# Patient Record
Sex: MALE | Race: WHITE | Employment: OTHER | URBAN - METROPOLITAN AREA
[De-identification: names, ages, dates, MRNs, and addresses within clinical notes are randomized per-mention and may not be internally consistent; named-entity substitution may affect disease eponyms.]

---

## 2018-03-12 ENCOUNTER — HOSPITAL ENCOUNTER (OUTPATIENT)
Age: 63
Discharge: HOME | End: 2018-03-12
Payer: COMMERCIAL

## 2018-03-12 LAB
ANION GAP SERPL CALCULATED.3IONS-SCNC: 12 MMOL/L (ref 7–16)
BUN BLDV-MCNC: 66 MG/DL (ref 8–23)
CALCIUM SERPL-MCNC: 9.3 MG/DL (ref 8.6–10.2)
CHLORIDE BLD-SCNC: 102 MMOL/L (ref 98–107)
CO2: 24 MMOL/L (ref 22–29)
CREAT SERPL-MCNC: 2.6 MG/DL (ref 0.7–1.2)
GFR AFRICAN AMERICAN: 31
GFR NON-AFRICAN AMERICAN: 25 ML/MIN/1.73
GLUCOSE BLD-MCNC: 98 MG/DL (ref 74–109)
POTASSIUM SERPL-SCNC: 4.9 MMOL/L (ref 3.5–5)
SODIUM BLD-SCNC: 138 MMOL/L (ref 132–146)

## 2018-03-12 PROCEDURE — 36415 COLL VENOUS BLD VENIPUNCTURE: CPT

## 2018-03-12 PROCEDURE — 80048 BASIC METABOLIC PNL TOTAL CA: CPT

## 2018-06-18 ENCOUNTER — EMPLOYEE WELLNESS (OUTPATIENT)
Dept: OTHER | Age: 63
End: 2018-06-18

## 2018-06-18 LAB
CHOLESTEROL, TOTAL: 144 MG/DL (ref 0–199)
GLUCOSE BLD-MCNC: 88 MG/DL (ref 74–107)
HDLC SERPL-MCNC: 49 MG/DL
LDL CHOLESTEROL CALCULATED: 84 MG/DL (ref 0–99)
TRIGL SERPL-MCNC: 57 MG/DL (ref 0–149)

## 2018-06-25 VITALS — WEIGHT: 174 LBS

## 2018-11-01 ENCOUNTER — HOSPITAL ENCOUNTER (OUTPATIENT)
Age: 63
Discharge: HOME | End: 2018-11-01
Payer: COMMERCIAL

## 2018-11-01 LAB
ALT SERPL-CCNC: 22 U/L (ref 0–40)
ANION GAP SERPL CALCULATED.3IONS-SCNC: 15 MMOL/L (ref 7–16)
AST SERPL-CCNC: 16 U/L (ref 0–39)
BACTERIA: NORMAL /HPF
BASOPHILS ABSOLUTE: 0.09 E9/L (ref 0–0.2)
BASOPHILS RELATIVE PERCENT: 1.1 % (ref 0–2)
BILIRUBIN URINE: NEGATIVE
BLOOD, URINE: ABNORMAL
BUN BLDV-MCNC: 47 MG/DL (ref 8–23)
CALCIUM SERPL-MCNC: 8.6 MG/DL (ref 8.6–10.2)
CHLORIDE BLD-SCNC: 105 MMOL/L (ref 98–107)
CHOLESTEROL, TOTAL: 156 MG/DL (ref 0–199)
CLARITY: CLEAR
CO2: 25 MMOL/L (ref 22–29)
COLOR: YELLOW
CREAT SERPL-MCNC: 2.1 MG/DL (ref 0.7–1.2)
EOSINOPHILS ABSOLUTE: 0.24 E9/L (ref 0.05–0.5)
EOSINOPHILS RELATIVE PERCENT: 3 % (ref 0–6)
GFR AFRICAN AMERICAN: 39
GFR NON-AFRICAN AMERICAN: 32 ML/MIN/1.73
GLUCOSE BLD-MCNC: 91 MG/DL (ref 74–109)
GLUCOSE URINE: NEGATIVE MG/DL
HCT VFR BLD CALC: 40.7 % (ref 37–54)
HDLC SERPL-MCNC: 39 MG/DL
HEMOGLOBIN: 13.3 G/DL (ref 12.5–16.5)
IMMATURE GRANULOCYTES #: 0.03 E9/L
IMMATURE GRANULOCYTES %: 0.4 % (ref 0–5)
KETONES, URINE: NEGATIVE MG/DL
LDL CHOLESTEROL CALCULATED: 97 MG/DL (ref 0–99)
LEUKOCYTE ESTERASE, URINE: NEGATIVE
LYMPHOCYTES ABSOLUTE: 1.19 E9/L (ref 1.5–4)
LYMPHOCYTES RELATIVE PERCENT: 14.9 % (ref 20–42)
MAGNESIUM: 1.6 MG/DL (ref 1.6–2.6)
MCH RBC QN AUTO: 29.3 PG (ref 26–35)
MCHC RBC AUTO-ENTMCNC: 32.7 % (ref 32–34.5)
MCV RBC AUTO: 89.6 FL (ref 80–99.9)
MONOCYTES ABSOLUTE: 0.77 E9/L (ref 0.1–0.95)
MONOCYTES RELATIVE PERCENT: 9.6 % (ref 2–12)
NEUTROPHILS ABSOLUTE: 5.68 E9/L (ref 1.8–7.3)
NEUTROPHILS RELATIVE PERCENT: 71 % (ref 43–80)
NITRITE, URINE: NEGATIVE
PDW BLD-RTO: 13.3 FL (ref 11.5–15)
PH UA: 6 (ref 5–9)
PLATELET # BLD: 290 E9/L (ref 130–450)
PMV BLD AUTO: 9.4 FL (ref 7–12)
POTASSIUM SERPL-SCNC: 4.4 MMOL/L (ref 3.5–5)
PROSTATE SPECIFIC ANTIGEN: 0.43 NG/ML (ref 0–4)
PROTEIN UA: 100 MG/DL
RBC # BLD: 4.54 E12/L (ref 3.8–5.8)
RBC UA: NORMAL /HPF (ref 0–2)
SODIUM BLD-SCNC: 145 MMOL/L (ref 132–146)
SPECIFIC GRAVITY UA: 1.02 (ref 1–1.03)
TRIGL SERPL-MCNC: 98 MG/DL (ref 0–149)
TSH SERPL DL<=0.05 MIU/L-ACNC: 0.79 UIU/ML (ref 0.27–4.2)
UROBILINOGEN, URINE: 0.2 E.U./DL
VITAMIN D 25-HYDROXY: 27 NG/ML (ref 30–100)
VLDLC SERPL CALC-MCNC: 20 MG/DL
WBC # BLD: 8 E9/L (ref 4.5–11.5)
WBC UA: NORMAL /HPF (ref 0–5)

## 2018-11-01 PROCEDURE — 83735 ASSAY OF MAGNESIUM: CPT

## 2018-11-01 PROCEDURE — 84460 ALANINE AMINO (ALT) (SGPT): CPT

## 2018-11-01 PROCEDURE — 85025 COMPLETE CBC W/AUTO DIFF WBC: CPT

## 2018-11-01 PROCEDURE — G0103 PSA SCREENING: HCPCS

## 2018-11-01 PROCEDURE — 80061 LIPID PANEL: CPT

## 2018-11-01 PROCEDURE — 80048 BASIC METABOLIC PNL TOTAL CA: CPT

## 2018-11-01 PROCEDURE — 81001 URINALYSIS AUTO W/SCOPE: CPT

## 2018-11-01 PROCEDURE — 84450 TRANSFERASE (AST) (SGOT): CPT

## 2018-11-01 PROCEDURE — 82306 VITAMIN D 25 HYDROXY: CPT

## 2018-11-01 PROCEDURE — 36415 COLL VENOUS BLD VENIPUNCTURE: CPT

## 2018-11-01 PROCEDURE — 84443 ASSAY THYROID STIM HORMONE: CPT

## 2018-11-29 ENCOUNTER — HOSPITAL ENCOUNTER (OUTPATIENT)
Dept: NON INVASIVE DIAGNOSTICS | Age: 63
Discharge: HOME | End: 2018-11-29
Payer: COMMERCIAL

## 2018-11-29 VITALS — HEIGHT: 70 IN | BODY MASS INDEX: 25.05 KG/M2 | WEIGHT: 175 LBS

## 2018-11-29 LAB
LV EF: 60 %
LVEF MODALITY: NORMAL

## 2018-11-29 PROCEDURE — 93308 TTE F-UP OR LMTD: CPT

## 2018-11-29 PROCEDURE — 93350 STRESS TTE ONLY: CPT

## 2018-11-29 PROCEDURE — 93017 CV STRESS TEST TRACING ONLY: CPT

## 2018-11-29 PROCEDURE — 6360000004 HC RX CONTRAST MEDICATION: Performed by: FAMILY MEDICINE

## 2018-11-29 RX ADMIN — PERFLUTREN 1.65 MG: 6.52 INJECTION, SUSPENSION INTRAVENOUS at 10:20

## 2019-05-06 ENCOUNTER — HOSPITAL ENCOUNTER (OUTPATIENT)
Age: 64
Discharge: HOME | End: 2019-05-06
Payer: COMMERCIAL

## 2019-05-06 LAB
ALT SERPL-CCNC: 30 U/L (ref 0–40)
ANION GAP SERPL CALCULATED.3IONS-SCNC: 12 MMOL/L (ref 7–16)
AST SERPL-CCNC: 24 U/L (ref 0–39)
BACTERIA: ABNORMAL /HPF
BASOPHILS ABSOLUTE: 0.1 E9/L (ref 0–0.2)
BASOPHILS RELATIVE PERCENT: 1.2 % (ref 0–2)
BILIRUBIN URINE: NEGATIVE
BLOOD, URINE: ABNORMAL
BUN BLDV-MCNC: 63 MG/DL (ref 8–23)
CALCIUM SERPL-MCNC: 9.6 MG/DL (ref 8.6–10.2)
CHLORIDE BLD-SCNC: 102 MMOL/L (ref 98–107)
CHOLESTEROL, TOTAL: 177 MG/DL (ref 0–199)
CLARITY: CLEAR
CO2: 28 MMOL/L (ref 22–29)
COLOR: YELLOW
CREAT SERPL-MCNC: 2.9 MG/DL (ref 0.7–1.2)
EOSINOPHILS ABSOLUTE: 0.36 E9/L (ref 0.05–0.5)
EOSINOPHILS RELATIVE PERCENT: 4.1 % (ref 0–6)
GFR AFRICAN AMERICAN: 27
GFR NON-AFRICAN AMERICAN: 22 ML/MIN/1.73
GLUCOSE BLD-MCNC: 91 MG/DL (ref 74–99)
GLUCOSE URINE: NEGATIVE MG/DL
HCT VFR BLD CALC: 44 % (ref 37–54)
HDLC SERPL-MCNC: 45 MG/DL
HEMOGLOBIN: 14.4 G/DL (ref 12.5–16.5)
IMMATURE GRANULOCYTES #: 0.02 E9/L
IMMATURE GRANULOCYTES %: 0.2 % (ref 0–5)
KETONES, URINE: NEGATIVE MG/DL
LDL CHOLESTEROL CALCULATED: 114 MG/DL (ref 0–99)
LEUKOCYTE ESTERASE, URINE: NEGATIVE
LYMPHOCYTES ABSOLUTE: 1.16 E9/L (ref 1.5–4)
LYMPHOCYTES RELATIVE PERCENT: 13.4 % (ref 20–42)
MAGNESIUM: 2 MG/DL (ref 1.6–2.6)
MCH RBC QN AUTO: 30.3 PG (ref 26–35)
MCHC RBC AUTO-ENTMCNC: 32.7 % (ref 32–34.5)
MCV RBC AUTO: 92.6 FL (ref 80–99.9)
MONOCYTES ABSOLUTE: 0.87 E9/L (ref 0.1–0.95)
MONOCYTES RELATIVE PERCENT: 10 % (ref 2–12)
NEUTROPHILS ABSOLUTE: 6.17 E9/L (ref 1.8–7.3)
NEUTROPHILS RELATIVE PERCENT: 71.1 % (ref 43–80)
NITRITE, URINE: NEGATIVE
PDW BLD-RTO: 12.8 FL (ref 11.5–15)
PH UA: 5.5 (ref 5–9)
PLATELET # BLD: 267 E9/L (ref 130–450)
PMV BLD AUTO: 10.8 FL (ref 7–12)
POTASSIUM SERPL-SCNC: 4.9 MMOL/L (ref 3.5–5)
PROSTATE SPECIFIC ANTIGEN: 0.46 NG/ML (ref 0–4)
PROTEIN UA: 100 MG/DL
RBC # BLD: 4.75 E12/L (ref 3.8–5.8)
RBC UA: ABNORMAL /HPF (ref 0–2)
SODIUM BLD-SCNC: 142 MMOL/L (ref 132–146)
SPECIFIC GRAVITY UA: 1.01 (ref 1–1.03)
TRIGL SERPL-MCNC: 88 MG/DL (ref 0–149)
TSH SERPL DL<=0.05 MIU/L-ACNC: 1.27 UIU/ML (ref 0.27–4.2)
UROBILINOGEN, URINE: 0.2 E.U./DL
VITAMIN D 25-HYDROXY: 23 NG/ML (ref 30–100)
VLDLC SERPL CALC-MCNC: 18 MG/DL
WBC # BLD: 8.7 E9/L (ref 4.5–11.5)
WBC UA: ABNORMAL /HPF (ref 0–5)

## 2019-05-06 PROCEDURE — 81001 URINALYSIS AUTO W/SCOPE: CPT

## 2019-05-06 PROCEDURE — 80061 LIPID PANEL: CPT

## 2019-05-06 PROCEDURE — 84450 TRANSFERASE (AST) (SGOT): CPT

## 2019-05-06 PROCEDURE — 83735 ASSAY OF MAGNESIUM: CPT

## 2019-05-06 PROCEDURE — 36415 COLL VENOUS BLD VENIPUNCTURE: CPT

## 2019-05-06 PROCEDURE — 85025 COMPLETE CBC W/AUTO DIFF WBC: CPT

## 2019-05-06 PROCEDURE — G0103 PSA SCREENING: HCPCS

## 2019-05-06 PROCEDURE — 84443 ASSAY THYROID STIM HORMONE: CPT

## 2019-05-06 PROCEDURE — 82306 VITAMIN D 25 HYDROXY: CPT

## 2019-05-06 PROCEDURE — 80048 BASIC METABOLIC PNL TOTAL CA: CPT

## 2019-05-06 PROCEDURE — 84460 ALANINE AMINO (ALT) (SGPT): CPT

## 2021-03-21 ENCOUNTER — IMMUNIZATION (OUTPATIENT)
Dept: PRIMARY CARE CLINIC | Age: 66
End: 2021-03-21
Payer: COMMERCIAL

## 2021-03-21 PROCEDURE — 0001A COVID-19, PFIZER VACCINE 30MCG/0.3ML DOSE: CPT | Performed by: INTERNAL MEDICINE

## 2021-03-21 PROCEDURE — 91300 COVID-19, PFIZER VACCINE 30MCG/0.3ML DOSE: CPT | Performed by: INTERNAL MEDICINE

## 2021-04-19 ENCOUNTER — IMMUNIZATION (OUTPATIENT)
Dept: PRIMARY CARE CLINIC | Age: 66
End: 2021-04-19
Payer: COMMERCIAL

## 2021-04-19 PROCEDURE — 91300 COVID-19, PFIZER VACCINE 30MCG/0.3ML DOSE: CPT | Performed by: NURSE PRACTITIONER

## 2021-04-19 PROCEDURE — 0002A COVID-19, PFIZER VACCINE 30MCG/0.3ML DOSE: CPT | Performed by: NURSE PRACTITIONER

## 2021-12-07 ENCOUNTER — HOSPITAL ENCOUNTER (OUTPATIENT)
Age: 66
Discharge: HOME | End: 2021-12-07
Payer: COMMERCIAL

## 2021-12-07 LAB
ALT SERPL-CCNC: 35 U/L (ref 0–40)
ANION GAP SERPL CALCULATED.3IONS-SCNC: 14 MMOL/L (ref 7–16)
AST SERPL-CCNC: 23 U/L (ref 0–39)
BACTERIA: ABNORMAL /HPF
BASOPHILS ABSOLUTE: 0.1 E9/L (ref 0–0.2)
BASOPHILS RELATIVE PERCENT: 0.9 % (ref 0–2)
BILIRUBIN URINE: NEGATIVE
BLOOD, URINE: ABNORMAL
BUN BLDV-MCNC: 91 MG/DL (ref 6–23)
CALCIUM SERPL-MCNC: 9.2 MG/DL (ref 8.6–10.2)
CHLORIDE BLD-SCNC: 107 MMOL/L (ref 98–107)
CHOLESTEROL, TOTAL: 163 MG/DL (ref 0–199)
CLARITY: CLEAR
CO2: 18 MMOL/L (ref 22–29)
COLOR: YELLOW
CREAT SERPL-MCNC: 4.8 MG/DL (ref 0.7–1.2)
EOSINOPHILS ABSOLUTE: 0.34 E9/L (ref 0.05–0.5)
EOSINOPHILS RELATIVE PERCENT: 3 % (ref 0–6)
EPITHELIAL CELLS, UA: ABNORMAL /HPF
GFR AFRICAN AMERICAN: 15
GFR NON-AFRICAN AMERICAN: 12 ML/MIN/1.73
GLUCOSE BLD-MCNC: 108 MG/DL (ref 74–99)
GLUCOSE URINE: NEGATIVE MG/DL
HCT VFR BLD CALC: 35.8 % (ref 37–54)
HDLC SERPL-MCNC: 36 MG/DL
HEMOGLOBIN: 11.8 G/DL (ref 12.5–16.5)
IMMATURE GRANULOCYTES #: 0.06 E9/L
IMMATURE GRANULOCYTES %: 0.5 % (ref 0–5)
KETONES, URINE: NEGATIVE MG/DL
LDL CHOLESTEROL CALCULATED: 106 MG/DL (ref 0–99)
LEUKOCYTE ESTERASE, URINE: NEGATIVE
LYMPHOCYTES ABSOLUTE: 0.9 E9/L (ref 1.5–4)
LYMPHOCYTES RELATIVE PERCENT: 7.8 % (ref 20–42)
MAGNESIUM: 1.9 MG/DL (ref 1.6–2.6)
MCH RBC QN AUTO: 30 PG (ref 26–35)
MCHC RBC AUTO-ENTMCNC: 33 % (ref 32–34.5)
MCV RBC AUTO: 91.1 FL (ref 80–99.9)
MONOCYTES ABSOLUTE: 0.78 E9/L (ref 0.1–0.95)
MONOCYTES RELATIVE PERCENT: 6.8 % (ref 2–12)
NEUTROPHILS ABSOLUTE: 9.33 E9/L (ref 1.8–7.3)
NEUTROPHILS RELATIVE PERCENT: 81 % (ref 43–80)
NITRITE, URINE: NEGATIVE
PDW BLD-RTO: 13.9 FL (ref 11.5–15)
PH UA: 6 (ref 5–9)
PLATELET # BLD: 304 E9/L (ref 130–450)
PMV BLD AUTO: 10.3 FL (ref 7–12)
POTASSIUM SERPL-SCNC: 5.1 MMOL/L (ref 3.5–5)
PROSTATE SPECIFIC ANTIGEN: 0.43 NG/ML (ref 0–4)
PROTEIN UA: >=300 MG/DL
RBC # BLD: 3.93 E12/L (ref 3.8–5.8)
RBC UA: ABNORMAL /HPF (ref 0–2)
SODIUM BLD-SCNC: 139 MMOL/L (ref 132–146)
SPECIFIC GRAVITY UA: 1.02 (ref 1–1.03)
TRIGL SERPL-MCNC: 104 MG/DL (ref 0–149)
TSH SERPL DL<=0.05 MIU/L-ACNC: 1.41 UIU/ML (ref 0.27–4.2)
UROBILINOGEN, URINE: 0.2 E.U./DL
VITAMIN D 25-HYDROXY: 24 NG/ML (ref 30–100)
VLDLC SERPL CALC-MCNC: 21 MG/DL
WBC # BLD: 11.5 E9/L (ref 4.5–11.5)
WBC UA: ABNORMAL /HPF (ref 0–5)

## 2021-12-07 PROCEDURE — 36415 COLL VENOUS BLD VENIPUNCTURE: CPT

## 2021-12-07 PROCEDURE — G0103 PSA SCREENING: HCPCS

## 2021-12-07 PROCEDURE — 83735 ASSAY OF MAGNESIUM: CPT

## 2021-12-07 PROCEDURE — 84460 ALANINE AMINO (ALT) (SGPT): CPT

## 2021-12-07 PROCEDURE — 85025 COMPLETE CBC W/AUTO DIFF WBC: CPT

## 2021-12-07 PROCEDURE — 84450 TRANSFERASE (AST) (SGOT): CPT

## 2021-12-07 PROCEDURE — 80061 LIPID PANEL: CPT

## 2021-12-07 PROCEDURE — 80048 BASIC METABOLIC PNL TOTAL CA: CPT

## 2021-12-07 PROCEDURE — 82306 VITAMIN D 25 HYDROXY: CPT

## 2021-12-07 PROCEDURE — 81001 URINALYSIS AUTO W/SCOPE: CPT

## 2021-12-07 PROCEDURE — 84439 ASSAY OF FREE THYROXINE: CPT

## 2021-12-07 PROCEDURE — 84443 ASSAY THYROID STIM HORMONE: CPT

## 2021-12-09 LAB — T4 FREE: 1.03 NG/DL (ref 0.93–1.7)

## 2021-12-16 ENCOUNTER — HOSPITAL ENCOUNTER (OUTPATIENT)
Dept: ULTRASOUND IMAGING | Age: 66
Discharge: HOME | End: 2021-12-18
Payer: COMMERCIAL

## 2021-12-16 DIAGNOSIS — N17.9 ACUTE RENAL FAILURE, UNSPECIFIED ACUTE RENAL FAILURE TYPE (HCC): ICD-10-CM

## 2021-12-16 PROCEDURE — 76770 US EXAM ABDO BACK WALL COMP: CPT

## 2021-12-16 NOTE — PROGRESS NOTES
Patient came today for a STAT kidney ultrasound. Told patient that he would have to wait for results from the doctor. Patient decided not to wait and refused to sign AMA form.

## 2021-12-20 ENCOUNTER — HOSPITAL ENCOUNTER (OUTPATIENT)
Age: 66
Discharge: HOME | End: 2021-12-20
Payer: COMMERCIAL

## 2021-12-20 LAB
ANION GAP SERPL CALCULATED.3IONS-SCNC: 16 MMOL/L (ref 7–16)
BUN BLDV-MCNC: 81 MG/DL (ref 6–23)
CALCIUM SERPL-MCNC: 9.7 MG/DL (ref 8.6–10.2)
CHLORIDE BLD-SCNC: 102 MMOL/L (ref 98–107)
CO2: 20 MMOL/L (ref 22–29)
CREAT SERPL-MCNC: 5.4 MG/DL (ref 0.7–1.2)
GFR AFRICAN AMERICAN: 13
GFR NON-AFRICAN AMERICAN: 11 ML/MIN/1.73
GLUCOSE BLD-MCNC: 109 MG/DL (ref 74–99)
POTASSIUM SERPL-SCNC: 5.5 MMOL/L (ref 3.5–5)
SODIUM BLD-SCNC: 138 MMOL/L (ref 132–146)

## 2021-12-20 PROCEDURE — 36415 COLL VENOUS BLD VENIPUNCTURE: CPT

## 2021-12-20 PROCEDURE — 80048 BASIC METABOLIC PNL TOTAL CA: CPT

## 2022-01-06 ENCOUNTER — HOSPITAL ENCOUNTER (OUTPATIENT)
Age: 67
Discharge: HOME | End: 2022-01-06
Payer: COMMERCIAL

## 2022-01-06 LAB
ALBUMIN SERPL-MCNC: 3.8 G/DL (ref 3.5–5.2)
ALP BLD-CCNC: 77 U/L (ref 40–129)
ALT SERPL-CCNC: 36 U/L (ref 0–40)
ANION GAP SERPL CALCULATED.3IONS-SCNC: 16 MMOL/L (ref 7–16)
ANTI-NUCLEAR ANTIBODY (ANA): NEGATIVE
AST SERPL-CCNC: 28 U/L (ref 0–39)
BASOPHILS ABSOLUTE: 0.1 E9/L (ref 0–0.2)
BASOPHILS RELATIVE PERCENT: 0.9 % (ref 0–2)
BILIRUB SERPL-MCNC: <0.2 MG/DL (ref 0–1.2)
BUN BLDV-MCNC: 87 MG/DL (ref 6–23)
C3 COMPLEMENT: 138 MG/DL (ref 90–180)
C4 COMPLEMENT: 40 MG/DL (ref 10–40)
CALCIUM SERPL-MCNC: 9.5 MG/DL (ref 8.6–10.2)
CHLORIDE BLD-SCNC: 105 MMOL/L (ref 98–107)
CO2: 19 MMOL/L (ref 22–29)
CREAT SERPL-MCNC: 5.7 MG/DL (ref 0.7–1.2)
CREATININE URINE: 74 MG/DL (ref 40–278)
EOSINOPHILS ABSOLUTE: 0.85 E9/L (ref 0.05–0.5)
EOSINOPHILS RELATIVE PERCENT: 7.4 % (ref 0–6)
GFR AFRICAN AMERICAN: 12
GFR NON-AFRICAN AMERICAN: 10 ML/MIN/1.73
GLUCOSE BLD-MCNC: 90 MG/DL (ref 74–99)
HCT VFR BLD CALC: 36.2 % (ref 37–54)
HEMOGLOBIN: 11.6 G/DL (ref 12.5–16.5)
IMMATURE GRANULOCYTES #: 0.13 E9/L
IMMATURE GRANULOCYTES %: 1.1 % (ref 0–5)
INR BLD: 1
LYMPHOCYTES ABSOLUTE: 1.18 E9/L (ref 1.5–4)
LYMPHOCYTES RELATIVE PERCENT: 10.2 % (ref 20–42)
MCH RBC QN AUTO: 29.8 PG (ref 26–35)
MCHC RBC AUTO-ENTMCNC: 32 % (ref 32–34.5)
MCV RBC AUTO: 93.1 FL (ref 80–99.9)
MICROALBUMIN UR-MCNC: 2052.4 MG/L
MICROALBUMIN/CREAT UR-RTO: 2773.5 (ref 0–30)
MONOCYTES ABSOLUTE: 1.07 E9/L (ref 0.1–0.95)
MONOCYTES RELATIVE PERCENT: 9.3 % (ref 2–12)
NEUTROPHILS ABSOLUTE: 8.2 E9/L (ref 1.8–7.3)
NEUTROPHILS RELATIVE PERCENT: 71.1 % (ref 43–80)
PDW BLD-RTO: 14.4 FL (ref 11.5–15)
PLATELET # BLD: 342 E9/L (ref 130–450)
PMV BLD AUTO: 9.5 FL (ref 7–12)
POTASSIUM SERPL-SCNC: 5.3 MMOL/L (ref 3.5–5)
PROTHROMBIN TIME: 10.8 SEC (ref 9.3–12.4)
RBC # BLD: 3.89 E12/L (ref 3.8–5.8)
SEDIMENTATION RATE, ERYTHROCYTE: 70 MM/HR (ref 0–15)
SODIUM BLD-SCNC: 140 MMOL/L (ref 132–146)
TOTAL PROTEIN: 7 G/DL (ref 6.4–8.3)
WBC # BLD: 11.5 E9/L (ref 4.5–11.5)

## 2022-01-06 PROCEDURE — 86334 IMMUNOFIX E-PHORESIS SERUM: CPT

## 2022-01-06 PROCEDURE — 86160 COMPLEMENT ANTIGEN: CPT

## 2022-01-06 PROCEDURE — 86255 FLUORESCENT ANTIBODY SCREEN: CPT

## 2022-01-06 PROCEDURE — 85025 COMPLETE CBC W/AUTO DIFF WBC: CPT

## 2022-01-06 PROCEDURE — 86803 HEPATITIS C AB TEST: CPT

## 2022-01-06 PROCEDURE — 87340 HEPATITIS B SURFACE AG IA: CPT

## 2022-01-06 PROCEDURE — 80053 COMPREHEN METABOLIC PANEL: CPT

## 2022-01-06 PROCEDURE — 85610 PROTHROMBIN TIME: CPT

## 2022-01-06 PROCEDURE — 84165 PROTEIN E-PHORESIS SERUM: CPT

## 2022-01-06 PROCEDURE — 82570 ASSAY OF URINE CREATININE: CPT

## 2022-01-06 PROCEDURE — 84166 PROTEIN E-PHORESIS/URINE/CSF: CPT

## 2022-01-06 PROCEDURE — 82044 UR ALBUMIN SEMIQUANTITATIVE: CPT

## 2022-01-06 PROCEDURE — 86038 ANTINUCLEAR ANTIBODIES: CPT

## 2022-01-06 PROCEDURE — 85651 RBC SED RATE NONAUTOMATED: CPT

## 2022-01-06 PROCEDURE — 36415 COLL VENOUS BLD VENIPUNCTURE: CPT

## 2022-01-07 LAB
ADDENDUM ELECTROPHORESIS URINE RANDOM: NORMAL
HEPATITIS B SURFACE ANTIGEN INTERPRETATION: NORMAL
HEPATITIS C ANTIBODY INTERPRETATION: NORMAL
IMMUNOFIXATION URINE: NORMAL

## 2022-01-08 LAB
ALBUMIN SERPL-MCNC: 3.2 G/DL (ref 3.5–4.7)
ALPHA-1-GLOBULIN: 0.3 G/DL (ref 0.2–0.4)
ALPHA-2-GLOBULIN: 1 G/DL (ref 0.5–1)
BETA GLOBULIN: 1.2 G/DL (ref 0.8–1.3)
ELECTROPHORESIS: ABNORMAL
GAMMA GLOBULIN: 1 G/DL (ref 0.7–1.6)

## 2022-01-12 ENCOUNTER — HOSPITAL ENCOUNTER (OUTPATIENT)
Dept: CT IMAGING | Age: 67
Discharge: HOME | End: 2022-01-12
Payer: COMMERCIAL

## 2022-01-12 VITALS
HEIGHT: 69 IN | WEIGHT: 200 LBS | BODY MASS INDEX: 29.62 KG/M2 | RESPIRATION RATE: 16 BRPM | HEART RATE: 78 BPM | SYSTOLIC BLOOD PRESSURE: 160 MMHG | TEMPERATURE: 97.7 F | DIASTOLIC BLOOD PRESSURE: 80 MMHG | OXYGEN SATURATION: 98 %

## 2022-01-12 DIAGNOSIS — N18.4 CHRONIC KIDNEY DISEASE, STAGE IV (SEVERE) (HCC): ICD-10-CM

## 2022-01-12 LAB
APTT: 32.2 SEC (ref 24.5–35.1)
HCT VFR BLD CALC: 33.2 % (ref 37–54)
HEMOGLOBIN: 11 G/DL (ref 12.5–16.5)
INR BLD: 1
MCH RBC QN AUTO: 30.4 PG (ref 26–35)
MCHC RBC AUTO-ENTMCNC: 33.1 % (ref 32–34.5)
MCV RBC AUTO: 91.7 FL (ref 80–99.9)
MYELOPEROXIDASE AB: 1 AU/ML (ref 0–19)
PDW BLD-RTO: 13.9 FL (ref 11.5–15)
PLATELET # BLD: 309 E9/L (ref 130–450)
PMV BLD AUTO: 9.2 FL (ref 7–12)
PROTHROMBIN TIME: 11.8 SEC (ref 9.3–12.4)
RBC # BLD: 3.62 E12/L (ref 3.8–5.8)
SERINE PROTEASE 3 AB: 0 AU/ML (ref 0–19)
WBC # BLD: 11.7 E9/L (ref 4.5–11.5)

## 2022-01-12 PROCEDURE — 7100000010 HC PHASE II RECOVERY - FIRST 15 MIN

## 2022-01-12 PROCEDURE — 85027 COMPLETE CBC AUTOMATED: CPT

## 2022-01-12 PROCEDURE — 36415 COLL VENOUS BLD VENIPUNCTURE: CPT

## 2022-01-12 PROCEDURE — 85730 THROMBOPLASTIN TIME PARTIAL: CPT

## 2022-01-12 PROCEDURE — 50200 RENAL BIOPSY PERQ: CPT

## 2022-01-12 PROCEDURE — 6360000002 HC RX W HCPCS: Performed by: RADIOLOGY

## 2022-01-12 PROCEDURE — 77012 CT SCAN FOR NEEDLE BIOPSY: CPT

## 2022-01-12 PROCEDURE — 7100000011 HC PHASE II RECOVERY - ADDTL 15 MIN

## 2022-01-12 PROCEDURE — 85610 PROTHROMBIN TIME: CPT

## 2022-01-12 RX ORDER — MIDAZOLAM HYDROCHLORIDE 1 MG/ML
INJECTION INTRAMUSCULAR; INTRAVENOUS
Status: COMPLETED | OUTPATIENT
Start: 2022-01-12 | End: 2022-01-12

## 2022-01-12 RX ORDER — HYDRALAZINE HYDROCHLORIDE 20 MG/ML
INJECTION INTRAMUSCULAR; INTRAVENOUS
Status: COMPLETED | OUTPATIENT
Start: 2022-01-12 | End: 2022-01-12

## 2022-01-12 RX ORDER — FENTANYL CITRATE 0.05 MG/ML
INJECTION, SOLUTION INTRAMUSCULAR; INTRAVENOUS
Status: COMPLETED | OUTPATIENT
Start: 2022-01-12 | End: 2022-01-12

## 2022-01-12 RX ORDER — SODIUM CHLORIDE 0.9 % (FLUSH) 0.9 %
5-40 SYRINGE (ML) INJECTION PRN
Status: DISCONTINUED | OUTPATIENT
Start: 2022-01-12 | End: 2022-01-13 | Stop reason: HOSPADM

## 2022-01-12 RX ADMIN — FENTANYL CITRATE 50 MCG: 50 INJECTION INTRAMUSCULAR; INTRAVENOUS at 10:55

## 2022-01-12 RX ADMIN — HYDRALAZINE HYDROCHLORIDE 10 MG: 20 INJECTION INTRAMUSCULAR; INTRAVENOUS at 11:01

## 2022-01-12 RX ADMIN — MIDAZOLAM 1 MG: 1 INJECTION INTRAMUSCULAR; INTRAVENOUS at 10:55

## 2022-01-12 ASSESSMENT — PAIN SCALES - GENERAL
PAINLEVEL_OUTOF10: 0

## 2022-01-12 ASSESSMENT — PAIN - FUNCTIONAL ASSESSMENT: PAIN_FUNCTIONAL_ASSESSMENT: 0-10

## 2022-11-21 ENCOUNTER — HOSPITAL ENCOUNTER (OUTPATIENT)
Age: 67
Discharge: HOME | End: 2022-11-21
Payer: COMMERCIAL

## 2022-11-21 LAB
ALT SERPL-CCNC: 20 U/L (ref 0–40)
ANION GAP SERPL CALCULATED.3IONS-SCNC: 17 MMOL/L (ref 7–16)
AST SERPL-CCNC: 19 U/L (ref 0–39)
BACTERIA: ABNORMAL /HPF
BASOPHILS ABSOLUTE: 0.1 E9/L (ref 0–0.2)
BASOPHILS RELATIVE PERCENT: 0.9 % (ref 0–2)
BILIRUBIN URINE: NEGATIVE
BLOOD, URINE: ABNORMAL
BUN BLDV-MCNC: 66 MG/DL (ref 6–23)
CALCIUM SERPL-MCNC: 10.5 MG/DL (ref 8.6–10.2)
CHLORIDE BLD-SCNC: 101 MMOL/L (ref 98–107)
CHOLESTEROL, TOTAL: 153 MG/DL (ref 0–199)
CLARITY: CLEAR
CO2: 21 MMOL/L (ref 22–29)
COLOR: YELLOW
CREAT SERPL-MCNC: 8 MG/DL (ref 0.7–1.2)
EOSINOPHILS ABSOLUTE: 0.55 E9/L (ref 0.05–0.5)
EOSINOPHILS RELATIVE PERCENT: 4.9 % (ref 0–6)
GFR SERPL CREATININE-BSD FRML MDRD: 7 ML/MIN/1.73
GLUCOSE BLD-MCNC: 84 MG/DL (ref 74–99)
GLUCOSE URINE: NEGATIVE MG/DL
HCT VFR BLD CALC: 34.7 % (ref 37–54)
HDLC SERPL-MCNC: 43 MG/DL
HEMOGLOBIN: 11.1 G/DL (ref 12.5–16.5)
IMMATURE GRANULOCYTES #: 0.04 E9/L
IMMATURE GRANULOCYTES %: 0.4 % (ref 0–5)
KETONES, URINE: NEGATIVE MG/DL
LDL CHOLESTEROL CALCULATED: 90 MG/DL (ref 0–99)
LEUKOCYTE ESTERASE, URINE: NEGATIVE
LYMPHOCYTES ABSOLUTE: 1.59 E9/L (ref 1.5–4)
LYMPHOCYTES RELATIVE PERCENT: 14.3 % (ref 20–42)
MAGNESIUM: 1.7 MG/DL (ref 1.6–2.6)
MCH RBC QN AUTO: 31.4 PG (ref 26–35)
MCHC RBC AUTO-ENTMCNC: 32 % (ref 32–34.5)
MCV RBC AUTO: 98.3 FL (ref 80–99.9)
MONOCYTES ABSOLUTE: 0.88 E9/L (ref 0.1–0.95)
MONOCYTES RELATIVE PERCENT: 7.9 % (ref 2–12)
NEUTROPHILS ABSOLUTE: 7.96 E9/L (ref 1.8–7.3)
NEUTROPHILS RELATIVE PERCENT: 71.6 % (ref 43–80)
NITRITE, URINE: NEGATIVE
PDW BLD-RTO: 14.2 FL (ref 11.5–15)
PH UA: 8 (ref 5–9)
PLATELET # BLD: 259 E9/L (ref 130–450)
PMV BLD AUTO: 9.6 FL (ref 7–12)
POTASSIUM SERPL-SCNC: 6.3 MMOL/L (ref 3.5–5)
PROSTATE SPECIFIC ANTIGEN: 0.53 NG/ML (ref 0–4)
PROTEIN UA: 100 MG/DL
RBC # BLD: 3.53 E12/L (ref 3.8–5.8)
RBC UA: ABNORMAL /HPF (ref 0–2)
SODIUM BLD-SCNC: 139 MMOL/L (ref 132–146)
SPECIFIC GRAVITY UA: 1.02 (ref 1–1.03)
T4 FREE: 1.3 NG/DL (ref 0.93–1.7)
TRIGL SERPL-MCNC: 100 MG/DL (ref 0–149)
TSH SERPL DL<=0.05 MIU/L-ACNC: 1.58 UIU/ML (ref 0.27–4.2)
UROBILINOGEN, URINE: 0.2 E.U./DL
VITAMIN D 25-HYDROXY: 29 NG/ML (ref 30–100)
VLDLC SERPL CALC-MCNC: 20 MG/DL
WBC # BLD: 11.1 E9/L (ref 4.5–11.5)
WBC UA: ABNORMAL /HPF (ref 0–5)

## 2022-11-21 PROCEDURE — 82306 VITAMIN D 25 HYDROXY: CPT

## 2022-11-21 PROCEDURE — 83735 ASSAY OF MAGNESIUM: CPT

## 2022-11-21 PROCEDURE — 80048 BASIC METABOLIC PNL TOTAL CA: CPT

## 2022-11-21 PROCEDURE — 36415 COLL VENOUS BLD VENIPUNCTURE: CPT

## 2022-11-21 PROCEDURE — 80061 LIPID PANEL: CPT

## 2022-11-21 PROCEDURE — 84439 ASSAY OF FREE THYROXINE: CPT

## 2022-11-21 PROCEDURE — 84460 ALANINE AMINO (ALT) (SGPT): CPT

## 2022-11-21 PROCEDURE — 84443 ASSAY THYROID STIM HORMONE: CPT

## 2022-11-21 PROCEDURE — 85025 COMPLETE CBC W/AUTO DIFF WBC: CPT

## 2022-11-21 PROCEDURE — G0103 PSA SCREENING: HCPCS

## 2022-11-21 PROCEDURE — 84450 TRANSFERASE (AST) (SGOT): CPT

## 2022-11-21 PROCEDURE — 81001 URINALYSIS AUTO W/SCOPE: CPT

## 2023-04-18 ENCOUNTER — HOSPITAL ENCOUNTER (OUTPATIENT)
Dept: CT IMAGING | Age: 68
Discharge: HOME | End: 2023-04-20
Payer: MEDICARE

## 2023-04-18 DIAGNOSIS — Z87.891 FORMER TOBACCO USE: ICD-10-CM

## 2023-04-18 PROCEDURE — 71250 CT THORAX DX C-: CPT

## 2023-09-01 ENCOUNTER — CARE COORDINATION (OUTPATIENT)
Dept: OTHER | Facility: CLINIC | Age: 68
End: 2023-09-01

## 2023-09-01 NOTE — CARE COORDINATION
Patient received from [address] [place] [address] as eligible for ACM services. ACM called patient,verified  and address for HIPAA security. Introduced [facility] for patient. Patient does not identify any Care Management needs at this time and declines services.

## 2023-10-09 ENCOUNTER — HOSPITAL ENCOUNTER (OUTPATIENT)
Age: 68
Setting detail: OBSERVATION
Discharge: HOME | End: 2023-10-10
Attending: EMERGENCY MEDICINE | Admitting: FAMILY MEDICINE
Payer: COMMERCIAL

## 2023-10-09 ENCOUNTER — APPOINTMENT (OUTPATIENT)
Dept: GENERAL RADIOLOGY | Age: 68
End: 2023-10-09
Payer: COMMERCIAL

## 2023-10-09 DIAGNOSIS — M25.512 ACUTE PAIN OF LEFT SHOULDER: Primary | ICD-10-CM

## 2023-10-09 LAB
ALBUMIN SERPL-MCNC: 3.9 G/DL (ref 3.5–5.2)
ALP SERPL-CCNC: 67 U/L (ref 40–129)
ALT SERPL-CCNC: 10 U/L (ref 0–40)
ANION GAP SERPL CALCULATED.3IONS-SCNC: 10 MMOL/L (ref 7–16)
AST SERPL-CCNC: 11 U/L (ref 0–39)
BASOPHILS # BLD: 0.05 K/UL (ref 0–0.2)
BASOPHILS NFR BLD: 1 % (ref 0–2)
BILIRUB SERPL-MCNC: 0.3 MG/DL (ref 0–1.2)
BUN SERPL-MCNC: 31 MG/DL (ref 6–23)
CALCIUM SERPL-MCNC: 9.8 MG/DL (ref 8.6–10.2)
CHLORIDE SERPL-SCNC: 99 MMOL/L (ref 98–107)
CO2 SERPL-SCNC: 31 MMOL/L (ref 22–29)
CREAT SERPL-MCNC: 5.6 MG/DL (ref 0.7–1.2)
EOSINOPHIL # BLD: 0.36 K/UL (ref 0.05–0.5)
EOSINOPHILS RELATIVE PERCENT: 5 % (ref 0–6)
ERYTHROCYTE [DISTWIDTH] IN BLOOD BY AUTOMATED COUNT: 14.4 % (ref 11.5–15)
GFR SERPL CREATININE-BSD FRML MDRD: 11 ML/MIN/1.73M2
GLUCOSE SERPL-MCNC: 163 MG/DL (ref 74–99)
HCT VFR BLD AUTO: 31.3 % (ref 37–54)
HGB BLD-MCNC: 10.2 G/DL (ref 12.5–16.5)
IMM GRANULOCYTES # BLD AUTO: <0.03 K/UL (ref 0–0.58)
IMM GRANULOCYTES NFR BLD: 0 % (ref 0–5)
LYMPHOCYTES NFR BLD: 1.01 K/UL (ref 1.5–4)
LYMPHOCYTES RELATIVE PERCENT: 14 % (ref 20–42)
MCH RBC QN AUTO: 30.9 PG (ref 26–35)
MCHC RBC AUTO-ENTMCNC: 32.6 G/DL (ref 32–34.5)
MCV RBC AUTO: 94.8 FL (ref 80–99.9)
MONOCYTES NFR BLD: 0.9 K/UL (ref 0.1–0.95)
MONOCYTES NFR BLD: 12 % (ref 2–12)
NEUTROPHILS NFR BLD: 68 % (ref 43–80)
NEUTS SEG NFR BLD: 4.97 K/UL (ref 1.8–7.3)
PLATELET # BLD AUTO: 218 K/UL (ref 130–450)
PMV BLD AUTO: 9.2 FL (ref 7–12)
POTASSIUM SERPL-SCNC: 5 MMOL/L (ref 3.5–5)
PROT SERPL-MCNC: 6.9 G/DL (ref 6.4–8.3)
RBC # BLD AUTO: 3.3 M/UL (ref 3.8–5.8)
SODIUM SERPL-SCNC: 140 MMOL/L (ref 132–146)
TROPONIN I SERPL HS-MCNC: 28 NG/L (ref 0–11)
TROPONIN I SERPL HS-MCNC: 28 NG/L (ref 0–11)
WBC OTHER # BLD: 7.3 K/UL (ref 4.5–11.5)

## 2023-10-09 PROCEDURE — 80053 COMPREHEN METABOLIC PANEL: CPT

## 2023-10-09 PROCEDURE — 99285 EMERGENCY DEPT VISIT HI MDM: CPT

## 2023-10-09 PROCEDURE — 84484 ASSAY OF TROPONIN QUANT: CPT

## 2023-10-09 PROCEDURE — 73030 X-RAY EXAM OF SHOULDER: CPT

## 2023-10-09 PROCEDURE — 6370000000 HC RX 637 (ALT 250 FOR IP): Performed by: EMERGENCY MEDICINE

## 2023-10-09 PROCEDURE — 85025 COMPLETE CBC W/AUTO DIFF WBC: CPT

## 2023-10-09 PROCEDURE — 93005 ELECTROCARDIOGRAM TRACING: CPT | Performed by: NURSE PRACTITIONER

## 2023-10-09 PROCEDURE — 71045 X-RAY EXAM CHEST 1 VIEW: CPT

## 2023-10-09 RX ORDER — HYDROCODONE BITARTRATE AND ACETAMINOPHEN 5; 325 MG/1; MG/1
1 TABLET ORAL ONCE
Status: COMPLETED | OUTPATIENT
Start: 2023-10-09 | End: 2023-10-09

## 2023-10-09 RX ORDER — HYDROCODONE BITARTRATE AND ACETAMINOPHEN 5; 325 MG/1; MG/1
1 TABLET ORAL EVERY 6 HOURS PRN
Qty: 12 TABLET | Refills: 0 | Status: SHIPPED | OUTPATIENT
Start: 2023-10-09 | End: 2023-10-09 | Stop reason: CLARIF

## 2023-10-09 RX ADMIN — HYDROCODONE BITARTRATE AND ACETAMINOPHEN 1 TABLET: 5; 325 TABLET ORAL at 22:26

## 2023-10-09 ASSESSMENT — LIFESTYLE VARIABLES
HOW MANY STANDARD DRINKS CONTAINING ALCOHOL DO YOU HAVE ON A TYPICAL DAY: PATIENT DOES NOT DRINK
HOW OFTEN DO YOU HAVE A DRINK CONTAINING ALCOHOL: NEVER

## 2023-10-09 ASSESSMENT — PAIN DESCRIPTION - ORIENTATION
ORIENTATION: LEFT
ORIENTATION: LEFT

## 2023-10-09 ASSESSMENT — PAIN DESCRIPTION - DESCRIPTORS
DESCRIPTORS: ACHING;DISCOMFORT;SORE
DESCRIPTORS: SPASM;ACHING

## 2023-10-09 ASSESSMENT — PAIN SCALES - GENERAL
PAINLEVEL_OUTOF10: 7
PAINLEVEL_OUTOF10: 7

## 2023-10-09 ASSESSMENT — PAIN DESCRIPTION - FREQUENCY: FREQUENCY: CONTINUOUS

## 2023-10-09 ASSESSMENT — PAIN DESCRIPTION - LOCATION
LOCATION: SHOULDER;ARM
LOCATION: SHOULDER

## 2023-10-09 ASSESSMENT — PAIN DESCRIPTION - ONSET: ONSET: ON-GOING

## 2023-10-09 ASSESSMENT — PAIN - FUNCTIONAL ASSESSMENT: PAIN_FUNCTIONAL_ASSESSMENT: 0-10

## 2023-10-09 ASSESSMENT — PAIN DESCRIPTION - PAIN TYPE: TYPE: ACUTE PAIN

## 2023-10-09 NOTE — ED NOTES
Patient hesitant about EKG & cardiac monitor. Patient was explained the reasoning why the EKG & cardiac monitor was ordered was to rule out any cardiac involvement since he was c/o left shoulder pain. Patient OK with EKG but still hesitant on the cardiac monitor. RN Jaimee Jaimes advised.       Lucero Friedman  10/09/23 2002

## 2023-10-09 NOTE — ED NOTES
Department of Emergency Medicine  FIRST PROVIDER TRIAGE NOTE             Independent MLP           10/9/23  6:58 PM EDT    Date of Encounter: 10/9/23   MRN: [id number]      HPI: Keren Chaudhry is a 79 y.o. male who presents to the ED for Shoulder Pain (Left shoulder pain into left upper arm, denies injury)   Pain worsened during dialysis today, Fistula in left upper arm. ROS: Negative for cp or sob. PE: Gen Appearance/Constitutional: alert  HEENT: NC/NT. PERRLA,  Airway patent. Neck: supple  CV: regular rate  Pulm: CTA bilat  GI: soft and NT  Musculoskeletal: moves all extremities x 4  Lymphatics: no edema     Initial Plan of Care: All treatment areas with department are currently occupied. Plan to order/Initiate the following while awaiting opening in ED: labs, EKG, and imaging studies.   Initiate Treatment-Testing, Proceed toTreatment Area When Bed Available for ED Attending/MLP to Continue Care    Electronically signed by DEBBIE Cárdenas CNP   DD: 10/9/23       DEBBIE Cárdenas CNP  10/09/23 0425

## 2023-10-10 ENCOUNTER — APPOINTMENT (OUTPATIENT)
Dept: NON INVASIVE DIAGNOSTICS | Age: 68
End: 2023-10-10
Payer: COMMERCIAL

## 2023-10-10 ENCOUNTER — APPOINTMENT (OUTPATIENT)
Dept: NUCLEAR MEDICINE | Age: 68
End: 2023-10-10
Payer: COMMERCIAL

## 2023-10-10 ENCOUNTER — APPOINTMENT (OUTPATIENT)
Dept: CT IMAGING | Age: 68
End: 2023-10-10
Payer: COMMERCIAL

## 2023-10-10 VITALS
DIASTOLIC BLOOD PRESSURE: 93 MMHG | SYSTOLIC BLOOD PRESSURE: 184 MMHG | HEART RATE: 99 BPM | WEIGHT: 200 LBS | RESPIRATION RATE: 20 BRPM | OXYGEN SATURATION: 96 % | TEMPERATURE: 97.2 F | BODY MASS INDEX: 29.62 KG/M2 | HEIGHT: 69 IN

## 2023-10-10 PROBLEM — M25.512 LEFT SHOULDER PAIN: Status: ACTIVE | Noted: 2023-10-10

## 2023-10-10 LAB
EKG ATRIAL RATE: 93 BPM
EKG P AXIS: 57 DEGREES
EKG P-R INTERVAL: 146 MS
EKG Q-T INTERVAL: 390 MS
EKG QRS DURATION: 88 MS
EKG QTC CALCULATION (BAZETT): 484 MS
EKG R AXIS: 36 DEGREES
EKG T AXIS: 53 DEGREES
EKG VENTRICULAR RATE: 93 BPM
TROPONIN I SERPL HS-MCNC: 28 NG/L (ref 0–11)

## 2023-10-10 PROCEDURE — A9500 TC99M SESTAMIBI: HCPCS | Performed by: RADIOLOGY

## 2023-10-10 PROCEDURE — 6370000000 HC RX 637 (ALT 250 FOR IP): Performed by: FAMILY MEDICINE

## 2023-10-10 PROCEDURE — 2580000003 HC RX 258: Performed by: FAMILY MEDICINE

## 2023-10-10 PROCEDURE — 6370000000 HC RX 637 (ALT 250 FOR IP): Performed by: EMERGENCY MEDICINE

## 2023-10-10 PROCEDURE — 78452 HT MUSCLE IMAGE SPECT MULT: CPT

## 2023-10-10 PROCEDURE — 36415 COLL VENOUS BLD VENIPUNCTURE: CPT

## 2023-10-10 PROCEDURE — 93010 ELECTROCARDIOGRAM REPORT: CPT | Performed by: INTERNAL MEDICINE

## 2023-10-10 PROCEDURE — 78452 HT MUSCLE IMAGE SPECT MULT: CPT | Performed by: INTERNAL MEDICINE

## 2023-10-10 PROCEDURE — 6370000000 HC RX 637 (ALT 250 FOR IP): Performed by: NURSE PRACTITIONER

## 2023-10-10 PROCEDURE — 6360000002 HC RX W HCPCS: Performed by: FAMILY MEDICINE

## 2023-10-10 PROCEDURE — G0378 HOSPITAL OBSERVATION PER HR: HCPCS

## 2023-10-10 PROCEDURE — 96374 THER/PROPH/DIAG INJ IV PUSH: CPT

## 2023-10-10 PROCEDURE — 93017 CV STRESS TEST TRACING ONLY: CPT

## 2023-10-10 PROCEDURE — 93016 CV STRESS TEST SUPVJ ONLY: CPT | Performed by: INTERNAL MEDICINE

## 2023-10-10 PROCEDURE — 6360000002 HC RX W HCPCS: Performed by: INTERNAL MEDICINE

## 2023-10-10 PROCEDURE — 73200 CT UPPER EXTREMITY W/O DYE: CPT

## 2023-10-10 PROCEDURE — 3430000000 HC RX DIAGNOSTIC RADIOPHARMACEUTICAL: Performed by: RADIOLOGY

## 2023-10-10 PROCEDURE — 84484 ASSAY OF TROPONIN QUANT: CPT

## 2023-10-10 PROCEDURE — 93018 CV STRESS TEST I&R ONLY: CPT | Performed by: INTERNAL MEDICINE

## 2023-10-10 PROCEDURE — 96372 THER/PROPH/DIAG INJ SC/IM: CPT

## 2023-10-10 RX ORDER — LIDOCAINE 4 G/G
1 PATCH TOPICAL DAILY
Qty: 30 EACH | Refills: 0 | Status: SHIPPED | OUTPATIENT
Start: 2023-10-11

## 2023-10-10 RX ORDER — ENOXAPARIN SODIUM 100 MG/ML
40 INJECTION SUBCUTANEOUS DAILY
Status: DISCONTINUED | OUTPATIENT
Start: 2023-10-10 | End: 2023-10-10 | Stop reason: CLARIF

## 2023-10-10 RX ORDER — HEPARIN SODIUM 10000 [USP'U]/ML
5000 INJECTION, SOLUTION INTRAVENOUS; SUBCUTANEOUS EVERY 8 HOURS
Status: DISCONTINUED | OUTPATIENT
Start: 2023-10-10 | End: 2023-10-10 | Stop reason: HOSPADM

## 2023-10-10 RX ORDER — ONDANSETRON 2 MG/ML
4 INJECTION INTRAMUSCULAR; INTRAVENOUS EVERY 6 HOURS PRN
Status: DISCONTINUED | OUTPATIENT
Start: 2023-10-10 | End: 2023-10-10 | Stop reason: HOSPADM

## 2023-10-10 RX ORDER — NITROGLYCERIN 0.4 MG/1
0.4 TABLET SUBLINGUAL EVERY 5 MIN PRN
Status: DISCONTINUED | OUTPATIENT
Start: 2023-10-10 | End: 2023-10-10 | Stop reason: HOSPADM

## 2023-10-10 RX ORDER — ACETAMINOPHEN 650 MG/1
650 SUPPOSITORY RECTAL EVERY 6 HOURS PRN
Status: DISCONTINUED | OUTPATIENT
Start: 2023-10-10 | End: 2023-10-10

## 2023-10-10 RX ORDER — TETRAKIS(2-METHOXYISOBUTYLISOCYANIDE)COPPER(I) TETRAFLUOROBORATE 1 MG/ML
13 INJECTION, POWDER, LYOPHILIZED, FOR SOLUTION INTRAVENOUS
Status: COMPLETED | OUTPATIENT
Start: 2023-10-10 | End: 2023-10-10

## 2023-10-10 RX ORDER — SODIUM CHLORIDE 0.9 % (FLUSH) 0.9 %
5-40 SYRINGE (ML) INJECTION PRN
Status: DISCONTINUED | OUTPATIENT
Start: 2023-10-10 | End: 2023-10-10 | Stop reason: HOSPADM

## 2023-10-10 RX ORDER — REGADENOSON 0.08 MG/ML
0.4 INJECTION, SOLUTION INTRAVENOUS
Status: COMPLETED | OUTPATIENT
Start: 2023-10-10 | End: 2023-10-10

## 2023-10-10 RX ORDER — ONDANSETRON 4 MG/1
4 TABLET, ORALLY DISINTEGRATING ORAL EVERY 8 HOURS PRN
Status: DISCONTINUED | OUTPATIENT
Start: 2023-10-10 | End: 2023-10-10 | Stop reason: HOSPADM

## 2023-10-10 RX ORDER — METHOCARBAMOL 750 MG/1
750 TABLET, FILM COATED ORAL 3 TIMES DAILY
Status: DISCONTINUED | OUTPATIENT
Start: 2023-10-10 | End: 2023-10-10 | Stop reason: HOSPADM

## 2023-10-10 RX ORDER — VALSARTAN 160 MG/1
160 TABLET ORAL NIGHTLY
COMMUNITY

## 2023-10-10 RX ORDER — VALSARTAN 160 MG/1
160 TABLET ORAL NIGHTLY
Status: DISCONTINUED | OUTPATIENT
Start: 2023-10-10 | End: 2023-10-10 | Stop reason: HOSPADM

## 2023-10-10 RX ORDER — POLYETHYLENE GLYCOL 3350 17 G/17G
17 POWDER, FOR SOLUTION ORAL DAILY PRN
Status: DISCONTINUED | OUTPATIENT
Start: 2023-10-10 | End: 2023-10-10 | Stop reason: HOSPADM

## 2023-10-10 RX ORDER — ACETAMINOPHEN 325 MG/1
650 TABLET ORAL EVERY 6 HOURS PRN
Status: DISCONTINUED | OUTPATIENT
Start: 2023-10-10 | End: 2023-10-10

## 2023-10-10 RX ORDER — SODIUM CHLORIDE 9 MG/ML
INJECTION, SOLUTION INTRAVENOUS PRN
Status: DISCONTINUED | OUTPATIENT
Start: 2023-10-10 | End: 2023-10-10 | Stop reason: HOSPADM

## 2023-10-10 RX ORDER — FENTANYL CITRATE 50 UG/ML
50 INJECTION, SOLUTION INTRAMUSCULAR; INTRAVENOUS
Status: DISCONTINUED | OUTPATIENT
Start: 2023-10-10 | End: 2023-10-10

## 2023-10-10 RX ORDER — METHOCARBAMOL 750 MG/1
750 TABLET, FILM COATED ORAL 3 TIMES DAILY
Qty: 30 TABLET | Refills: 0 | Status: SHIPPED | OUTPATIENT
Start: 2023-10-10 | End: 2023-10-20

## 2023-10-10 RX ORDER — OXYCODONE HYDROCHLORIDE AND ACETAMINOPHEN 5; 325 MG/1; MG/1
1 TABLET ORAL ONCE
Status: COMPLETED | OUTPATIENT
Start: 2023-10-10 | End: 2023-10-10

## 2023-10-10 RX ORDER — LIDOCAINE 4 G/G
1 PATCH TOPICAL DAILY
Status: DISCONTINUED | OUTPATIENT
Start: 2023-10-10 | End: 2023-10-10 | Stop reason: HOSPADM

## 2023-10-10 RX ORDER — ACETAMINOPHEN 500 MG
1000 TABLET ORAL EVERY 8 HOURS PRN
Status: DISCONTINUED | OUTPATIENT
Start: 2023-10-10 | End: 2023-10-10 | Stop reason: HOSPADM

## 2023-10-10 RX ORDER — ATORVASTATIN CALCIUM 40 MG/1
40 TABLET, FILM COATED ORAL NIGHTLY
Status: DISCONTINUED | OUTPATIENT
Start: 2023-10-10 | End: 2023-10-10 | Stop reason: HOSPADM

## 2023-10-10 RX ORDER — METOPROLOL TARTRATE 50 MG/1
50 TABLET, FILM COATED ORAL 2 TIMES DAILY
Status: DISCONTINUED | OUTPATIENT
Start: 2023-10-10 | End: 2023-10-10 | Stop reason: HOSPADM

## 2023-10-10 RX ORDER — ASPIRIN 81 MG/1
81 TABLET, CHEWABLE ORAL DAILY
Status: DISCONTINUED | OUTPATIENT
Start: 2023-10-11 | End: 2023-10-10 | Stop reason: HOSPADM

## 2023-10-10 RX ORDER — AMINOPHYLLINE DIHYDRATE 25 MG/ML
50 INJECTION, SOLUTION INTRAVENOUS ONCE
Status: COMPLETED | OUTPATIENT
Start: 2023-10-10 | End: 2023-10-10

## 2023-10-10 RX ORDER — SODIUM CHLORIDE 0.9 % (FLUSH) 0.9 %
5-40 SYRINGE (ML) INJECTION EVERY 12 HOURS SCHEDULED
Status: DISCONTINUED | OUTPATIENT
Start: 2023-10-10 | End: 2023-10-10 | Stop reason: HOSPADM

## 2023-10-10 RX ORDER — TETRAKIS(2-METHOXYISOBUTYLISOCYANIDE)COPPER(I) TETRAFLUOROBORATE 1 MG/ML
35 INJECTION, POWDER, LYOPHILIZED, FOR SOLUTION INTRAVENOUS
Status: COMPLETED | OUTPATIENT
Start: 2023-10-10 | End: 2023-10-10

## 2023-10-10 RX ORDER — HYDRALAZINE HYDROCHLORIDE 50 MG/1
100 TABLET, FILM COATED ORAL 2 TIMES DAILY
Status: DISCONTINUED | OUTPATIENT
Start: 2023-10-10 | End: 2023-10-10 | Stop reason: HOSPADM

## 2023-10-10 RX ORDER — AMLODIPINE BESYLATE 10 MG/1
10 TABLET ORAL DAILY
Status: DISCONTINUED | OUTPATIENT
Start: 2023-10-10 | End: 2023-10-10 | Stop reason: HOSPADM

## 2023-10-10 RX ADMIN — SODIUM CHLORIDE, PRESERVATIVE FREE 10 ML: 5 INJECTION INTRAVENOUS at 11:30

## 2023-10-10 RX ADMIN — OXYCODONE AND ACETAMINOPHEN 1 TABLET: 5; 325 TABLET ORAL at 01:36

## 2023-10-10 RX ADMIN — HEPARIN SODIUM 5000 UNITS: 10000 INJECTION INTRAVENOUS; SUBCUTANEOUS at 06:37

## 2023-10-10 RX ADMIN — Medication 13 MILLICURIE: at 08:18

## 2023-10-10 RX ADMIN — METHOCARBAMOL 750 MG: 750 TABLET ORAL at 13:17

## 2023-10-10 RX ADMIN — Medication 39 MILLICURIE: at 10:11

## 2023-10-10 RX ADMIN — METOPROLOL TARTRATE 50 MG: 50 TABLET ORAL at 11:30

## 2023-10-10 RX ADMIN — HYDRALAZINE HYDROCHLORIDE 100 MG: 50 TABLET, FILM COATED ORAL at 11:30

## 2023-10-10 RX ADMIN — AMLODIPINE BESYLATE 10 MG: 10 TABLET ORAL at 11:30

## 2023-10-10 RX ADMIN — SODIUM CHLORIDE, PRESERVATIVE FREE 10 ML: 5 INJECTION INTRAVENOUS at 07:26

## 2023-10-10 RX ADMIN — AMINOPHYLLINE 50 MG: 25 INJECTION, SOLUTION INTRAVENOUS at 10:28

## 2023-10-10 RX ADMIN — FENTANYL CITRATE 50 MCG: 50 INJECTION INTRAMUSCULAR; INTRAVENOUS at 07:25

## 2023-10-10 RX ADMIN — REGADENOSON 0.4 MG: 0.08 INJECTION, SOLUTION INTRAVENOUS at 10:11

## 2023-10-10 ASSESSMENT — PAIN DESCRIPTION - PAIN TYPE: TYPE: ACUTE PAIN

## 2023-10-10 ASSESSMENT — PAIN DESCRIPTION - FREQUENCY: FREQUENCY: INTERMITTENT

## 2023-10-10 ASSESSMENT — PAIN DESCRIPTION - LOCATION
LOCATION: SHOULDER
LOCATION: SHOULDER

## 2023-10-10 ASSESSMENT — PAIN DESCRIPTION - DESCRIPTORS
DESCRIPTORS: SPASM
DESCRIPTORS: SHOOTING;STABBING;SQUEEZING

## 2023-10-10 ASSESSMENT — PAIN SCALES - GENERAL
PAINLEVEL_OUTOF10: 8
PAINLEVEL_OUTOF10: 5

## 2023-10-10 ASSESSMENT — PAIN DESCRIPTION - DIRECTION: RADIATING_TOWARDS: NECK

## 2023-10-10 ASSESSMENT — PAIN DESCRIPTION - ORIENTATION
ORIENTATION: LEFT
ORIENTATION: LEFT

## 2023-10-10 NOTE — PROGRESS NOTES
Hospitalist Progress Note      Patient admitted this morning by my colleague for cardiac evaluation. He presented to ED with L shoulder pain radiating to the L arm w/o associated symptoms. In ED he was intermittently mildly hypertensive and tachycardic. Laboratory workup w/ renal insufficiency at apparent baseline, elevated but adynamic troponin, and normocytic anemia at baseline. Imaging concerning for calcific tendonitis. EKG w/o ischemia. He was admitted for stress test. Monitor BP and HR after AM meds given. May be discharged later this afternoon if stress test unrevealing.  Non-billable rounding.      +++++++++++++++++++++++++++++++++++++++++++++++++  Minnie Reilly, [address] [facility] [address] [facility], [facility]

## 2023-10-10 NOTE — PLAN OF CARE
Problem: Discharge Planning  Goal: Discharge to home or other facility with appropriate resources  10/10/2023 1434 by Sherif Foster RN  Outcome: Completed  10/10/2023 1203 by Sherif Foster RN  Outcome: Progressing  Flowsheets (Taken 10/10/2023 1000)  Discharge to home or other facility with appropriate resources: Identify barriers to discharge with patient and caregiver  10/10/2023 0311 by David Gruber RN  Outcome: Progressing  Flowsheets (Taken 10/10/2023 0309)  Discharge to home or other facility with appropriate resources: Identify barriers to discharge with patient and caregiver     Problem: Pain  Goal: Verbalizes/displays adequate comfort level or baseline comfort level  10/10/2023 1434 by Sherif Foster RN  Outcome: Completed  10/10/2023 1203 by Sherif Foster RN  Outcome: Progressing  10/10/2023 0311 by David Gruber RN  Outcome: Progressing     Problem: Safety - Adult  Goal: Free from fall injury  10/10/2023 1434 by Sherif Foster RN  Outcome: Completed  10/10/2023 1203 by Sherif Foster RN  Outcome: Progressing  Flowsheets (Taken 10/10/2023 0821)  Free From Fall Injury: Instruct family/caregiver on patient safety  10/10/2023 0311 by David Gruber RN  Outcome: Progressing     Problem: Chronic Conditions and Co-morbidities  Goal: Patient's chronic conditions and co-morbidity symptoms are monitored and maintained or improved  10/10/2023 1434 by Sherif Foster RN  Outcome: Completed  10/10/2023 1203 by Sherif Foster RN  Outcome: Progressing  Flowsheets (Taken 10/10/2023 1000)  Care Plan - Patient's Chronic Conditions and Co-Morbidity Symptoms are Monitored and Maintained or Improved:   Collaborate with multidisciplinary team to address chronic and comorbid conditions and prevent exacerbation or deterioration   Monitor and assess patient's chronic conditions and comorbid symptoms for stability, deterioration, or improvement

## 2023-10-10 NOTE — ED NOTES
Called report to Baldo, [address] [place] [address] for be [facility], [address] [place] [address],[address], RN  10/10/23 0129

## 2023-10-10 NOTE — PROGRESS NOTES
CLINICAL PHARMACY NOTE: MEDS TO BEDS    Total # of Prescriptions Filled: 2   The following medications were delivered to the patient:  Methocarbamol 750  Lidocaine patch 4%    Additional Documentation:  Wife picked up

## 2023-10-10 NOTE — H&P
Hospitalist History & Physical      PCP: Maximo Lopez DO    Date of Service: Pt seen/examined on 10/10/2023     Chief Complaint:  had concerns including Shoulder Pain (Left shoulder pain into left upper arm, denies injury). History Of Present Illness:    Mr. Stephanie Castro, a 79y.o. year old male  who  has a past medical history of Cerebral artery occlusion with cerebral infarction ([address]), CKD (chronic kidney disease), stage IV ([address]), COPD (chronic obstructive pulmonary disease) ([address]), Hx of blood clots, and Hypertension. Patient presented to the [facility] emergency department with left shoulder pain. Denies any specific injury. The pain is intermittent and radiates down left arm. Denies chest pain, shortness of breath, nausea, vomiting, diaphoresis. Vital signs within normal limits and stable. The patient is afebrile. Laboratory studies demonstrate BUN 31, creatinine 5.6, glucose 163, troponin 20 with repeat of 28. Chest x-ray and EKG were unremarkable. Patient transferred to [facility] for further evaluation treatment. Past Medical History:   Diagnosis Date    Cerebral artery occlusion with cerebral infarction ([address]) 2017    balance off    CKD (chronic kidney disease), stage IV (HCC)     COPD (chronic obstructive pulmonary disease) (HCC)     Hx of blood clots     Hypertension        Past Surgical History:   Procedure Laterality Date    IR BIOPSY KIDNEY PERCUTANEOUS  1/12/2022    IR BIOPSY KIDNEY PERCUTANEOUS 1/12/2022 [facility] CT    TONSILLECTOMY      VASECTOMY         Prior to Admission medications    Medication Sig Start Date End Date Taking?  Authorizing Provider   valsartan (DIOVAN) 160 MG tablet Take 1 tablet by mouth nightly   Yes Gregg Carr MD   amLODIPine (NORVASC) 10 MG tablet Take 1 tablet by mouth daily    Gregg Carr MD   metoprolol tartrate (LOPRESSOR) 50 MG tablet Take 1 tablet by mouth 2 times daily    Gregg Carr MD   hydrALAZINE

## 2023-10-10 NOTE — DISCHARGE SUMMARY
Hospitalist Progress Note      Patient admitted this morning by my colleague for cardiac evaluation. He presented to ED with L shoulder pain radiating to the L arm w/o associated symptoms. In ED he was intermittently mildly hypertensive and tachycardic. Laboratory workup w/ renal insufficiency at apparent baseline, elevated but adynamic troponin, and normocytic anemia at baseline. Imaging concerning for calcific tendonitis. EKG w/o ischemia. He was admitted for stress test which was low risk. Vitals improved with resumption of home meds CT of the shoulder was unrevealing. Orthopedic surgery referral placed.  He is now stable for discharge home with OP follow up.   +++++++++++++++++++++++++++++++++++++++++++++++++  DEBBIE Min  [facility] Physician - [address], [place]

## 2023-10-10 NOTE — PROGRESS NOTES
4 Eyes Skin Assessment     NAME:  Andrés Condon  YOB: 1955  MEDICAL RECORD NUMBER:  [id number]    The patient is being assessed for  Admission    I agree that at least one RN has performed a thorough Head to Toe Skin Assessment on the patient. ALL assessment sites listed below have been assessed. Areas assessed by both nurses:    Head, Face, Ears, Shoulders, Back, Chest, Arms, Elbows, Hands, Sacrum. Buttock, Coccyx, Ischium, and Legs. Feet and Heels        Does the Patient have a Wound?  No noted wound(s)       Leonardo Prevention initiated by RN: No  Wound Care Orders initiated by RN: No    Pressure Injury (Stage 3,4, Unstageable, DTI, NWPT, and Complex wounds) if present, place Wound referral order by RN under : No    New Ostomies, if present place, Ostomy referral order under : No     Nurse 1 eSignature: Electronically signed by Toño Cee RN on 10/10/23 at 6:08 AM EDT    **SHARE this note so that the co-signing nurse can place an eSignature**    Nurse 2 eSignature: {Esignature:[id number]}

## 2023-10-10 NOTE — PLAN OF CARE
Problem: Discharge Planning  Goal: Discharge to home or other facility with appropriate resources  Outcome: Progressing  Flowsheets (Taken 10/10/2023 0309)  Discharge to home or other facility with appropriate resources: Identify barriers to discharge with patient and caregiver     Problem: Pain  Goal: Verbalizes/displays adequate comfort level or baseline comfort level  Outcome: Progressing     Problem: Safety - Adult  Goal: Free from fall injury  Outcome: Progressing     Problem: Pain  Goal: Verbalizes/displays adequate comfort level or baseline comfort level  Outcome: Progressing     Problem: Discharge Planning  Goal: Discharge to home or other facility with appropriate resources  Outcome: Progressing  Flowsheets (Taken 10/10/2023 0309)  Discharge to home or other facility with appropriate resources: Identify barriers to discharge with patient and caregiver     Problem: Safety - Adult  Goal: Free from fall injury  Outcome: Progressing

## 2023-10-10 NOTE — PROCEDURES
[facility] Nuclear Stress Test:    Cardiologist: Dr. Edy Hagan MD    Date: 10/10/2023    Indications for study: Chest pain    No chest pain  No new arrhythmias  No EKG changes suggestive of stress induced ischemia  Nuclear images pending    Edy Hagan MD  [facility]) Cardiology

## 2023-10-10 NOTE — CARE COORDINATION
Chart reviewed for transition of care at discharge. Admitted with left shoulder pain. History of HD through a left arm fistula M-W Fri at [facility] in [facility]. Out of the room for a stress test. Wife Stewartangely Monet in the room. She stated that they live in a 2 story home, with bed and bath on the second floor. He has no DME in the home, and is independent with ADL's. No history of HHC or SNF. His PCP is Dr. Milagros Cee, and his pharmacy is through [facility]. Probable discharge if stress is negative. Plan is home with no needs, and wife transporting home when medically stable. CM/SW will follow. Electronically signed by Keven Chin RN on 10/10/2023 at 2:28 PM    Case Management Assessment  Initial Evaluation    Date/Time of Evaluation: 10/10/2023 2:29 PM  Assessment Completed by: Keven Chin RN    If patient is discharged prior to next notation, then this note serves as note for discharge by case management. Patient Name: Angie Hunter                   YOB: 1955  Diagnosis: Acute pain of left shoulder [M25.512]  Left shoulder pain [M25.512]                   Date / Time: 10/9/2023  7:16 PM    Patient Admission Status: Observation   Readmission Risk (Low < 19, Mod (19-27), High > 27): No data recorded  Current PCP: Sue Whitaker, DO  PCP verified by CM? Yes    Chart Reviewed: Yes      History Provided by: Spouse  Patient Orientation: Alert and Oriented, Person, Place, Situation    Patient Cognition: Alert    Hospitalization in the last 30 days (Readmission):  No    If yes, Readmission Assessment in CM Navigator will be completed.     Advance Directives:      Code Status: Full Code   Patient's Primary Decision Maker is: Legal Next of Kin      Discharge Planning:    Patient lives with: Spouse/Significant Other Type of Home: House  Primary Care Giver: Self  Patient Support Systems include: Spouse/Significant Other   Current Financial resources:    Current community resources:    Current services prior to

## 2023-10-11 ENCOUNTER — APPOINTMENT (OUTPATIENT)
Dept: ULTRASOUND IMAGING | Age: 68
End: 2023-10-11
Payer: MEDICARE

## 2023-10-11 ENCOUNTER — HOSPITAL ENCOUNTER (EMERGENCY)
Age: 68
Discharge: HOME | End: 2023-10-12
Attending: STUDENT IN AN ORGANIZED HEALTH CARE EDUCATION/TRAINING PROGRAM
Payer: MEDICARE

## 2023-10-11 ENCOUNTER — CARE COORDINATION (OUTPATIENT)
Dept: OTHER | Facility: CLINIC | Age: 68
End: 2023-10-11

## 2023-10-11 DIAGNOSIS — M25.512 ACUTE PAIN OF LEFT SHOULDER: ICD-10-CM

## 2023-10-11 DIAGNOSIS — N18.6 ESRD ON DIALYSIS (HCC): ICD-10-CM

## 2023-10-11 DIAGNOSIS — Z99.2 ESRD ON DIALYSIS (HCC): ICD-10-CM

## 2023-10-11 DIAGNOSIS — R20.2 ARM PARESTHESIA, LEFT: Primary | ICD-10-CM

## 2023-10-11 LAB
ALBUMIN SERPL-MCNC: 4.1 G/DL (ref 3.5–5.2)
ALP SERPL-CCNC: 68 U/L (ref 40–129)
ALT SERPL-CCNC: 16 U/L (ref 0–40)
ANION GAP SERPL CALCULATED.3IONS-SCNC: 12 MMOL/L (ref 7–16)
AST SERPL-CCNC: 18 U/L (ref 0–39)
BASOPHILS # BLD: 0.05 K/UL (ref 0–0.2)
BASOPHILS NFR BLD: 1 % (ref 0–2)
BILIRUB SERPL-MCNC: 0.3 MG/DL (ref 0–1.2)
BUN SERPL-MCNC: 29 MG/DL (ref 6–23)
CALCIUM SERPL-MCNC: 10.1 MG/DL (ref 8.6–10.2)
CHLORIDE SERPL-SCNC: 98 MMOL/L (ref 98–107)
CO2 SERPL-SCNC: 30 MMOL/L (ref 22–29)
CREAT SERPL-MCNC: 5.5 MG/DL (ref 0.7–1.2)
EOSINOPHIL # BLD: 0.36 K/UL (ref 0.05–0.5)
EOSINOPHILS RELATIVE PERCENT: 6 % (ref 0–6)
ERYTHROCYTE [DISTWIDTH] IN BLOOD BY AUTOMATED COUNT: 14.3 % (ref 11.5–15)
GFR SERPL CREATININE-BSD FRML MDRD: 11 ML/MIN/1.73M2
GLUCOSE SERPL-MCNC: 97 MG/DL (ref 74–99)
HCT VFR BLD AUTO: 31.3 % (ref 37–54)
HGB BLD-MCNC: 9.9 G/DL (ref 12.5–16.5)
IMM GRANULOCYTES # BLD AUTO: <0.03 K/UL (ref 0–0.58)
IMM GRANULOCYTES NFR BLD: 0 % (ref 0–5)
LYMPHOCYTES NFR BLD: 1.08 K/UL (ref 1.5–4)
LYMPHOCYTES RELATIVE PERCENT: 17 % (ref 20–42)
MCH RBC QN AUTO: 31.1 PG (ref 26–35)
MCHC RBC AUTO-ENTMCNC: 31.6 G/DL (ref 32–34.5)
MCV RBC AUTO: 98.4 FL (ref 80–99.9)
MONOCYTES NFR BLD: 0.9 K/UL (ref 0.1–0.95)
MONOCYTES NFR BLD: 14 % (ref 2–12)
NEUTROPHILS NFR BLD: 61 % (ref 43–80)
NEUTS SEG NFR BLD: 3.82 K/UL (ref 1.8–7.3)
PLATELET # BLD AUTO: 223 K/UL (ref 130–450)
PMV BLD AUTO: 9.9 FL (ref 7–12)
POTASSIUM SERPL-SCNC: 5.2 MMOL/L (ref 3.5–5)
POTASSIUM SERPL-SCNC: 5.5 MMOL/L (ref 3.5–5)
PROT SERPL-MCNC: 7.1 G/DL (ref 6.4–8.3)
RBC # BLD AUTO: 3.18 M/UL (ref 3.8–5.8)
SODIUM SERPL-SCNC: 140 MMOL/L (ref 132–146)
TROPONIN I SERPL HS-MCNC: 31 NG/L (ref 0–11)
TROPONIN I SERPL HS-MCNC: 31 NG/L (ref 0–11)
WBC OTHER # BLD: 6.2 K/UL (ref 4.5–11.5)

## 2023-10-11 PROCEDURE — 93005 ELECTROCARDIOGRAM TRACING: CPT | Performed by: STUDENT IN AN ORGANIZED HEALTH CARE EDUCATION/TRAINING PROGRAM

## 2023-10-11 PROCEDURE — 84132 ASSAY OF SERUM POTASSIUM: CPT

## 2023-10-11 PROCEDURE — 6370000000 HC RX 637 (ALT 250 FOR IP): Performed by: STUDENT IN AN ORGANIZED HEALTH CARE EDUCATION/TRAINING PROGRAM

## 2023-10-11 PROCEDURE — 85025 COMPLETE CBC W/AUTO DIFF WBC: CPT

## 2023-10-11 PROCEDURE — 96372 THER/PROPH/DIAG INJ SC/IM: CPT

## 2023-10-11 PROCEDURE — 93971 EXTREMITY STUDY: CPT

## 2023-10-11 PROCEDURE — 99284 EMERGENCY DEPT VISIT MOD MDM: CPT

## 2023-10-11 PROCEDURE — 96374 THER/PROPH/DIAG INJ IV PUSH: CPT

## 2023-10-11 PROCEDURE — 80053 COMPREHEN METABOLIC PANEL: CPT

## 2023-10-11 PROCEDURE — 84484 ASSAY OF TROPONIN QUANT: CPT

## 2023-10-11 RX ORDER — HYDROCODONE BITARTRATE AND ACETAMINOPHEN 5; 325 MG/1; MG/1
1 TABLET ORAL ONCE
Status: COMPLETED | OUTPATIENT
Start: 2023-10-11 | End: 2023-10-11

## 2023-10-11 RX ADMIN — HYDROCODONE BITARTRATE AND ACETAMINOPHEN 1 TABLET: 5; 325 TABLET ORAL at 21:15

## 2023-10-11 ASSESSMENT — PAIN - FUNCTIONAL ASSESSMENT
PAIN_FUNCTIONAL_ASSESSMENT: ACTIVITIES ARE NOT PREVENTED
PAIN_FUNCTIONAL_ASSESSMENT: 0-10

## 2023-10-11 ASSESSMENT — PAIN DESCRIPTION - PAIN TYPE: TYPE: ACUTE PAIN

## 2023-10-11 ASSESSMENT — PAIN DESCRIPTION - ORIENTATION: ORIENTATION: LEFT

## 2023-10-11 ASSESSMENT — LIFESTYLE VARIABLES
HOW OFTEN DO YOU HAVE A DRINK CONTAINING ALCOHOL: NEVER
HOW MANY STANDARD DRINKS CONTAINING ALCOHOL DO YOU HAVE ON A TYPICAL DAY: PATIENT DOES NOT DRINK

## 2023-10-11 ASSESSMENT — PAIN DESCRIPTION - ONSET: ONSET: AWAKENED FROM SLEEP

## 2023-10-11 ASSESSMENT — PAIN SCALES - GENERAL: PAINLEVEL_OUTOF10: 10

## 2023-10-11 ASSESSMENT — PAIN DESCRIPTION - FREQUENCY: FREQUENCY: INTERMITTENT

## 2023-10-11 NOTE — CARE COORDINATION
Care Transitions Outreach Attempt    Call within 2 business days of discharge: Yes   Attempted to reach patient for transitions of care follow up. Unable to reach patient. Patient: Lilly Biswas Patient : 1955 MRN: [id number]    Last Discharge Facility       Date Complaint Diagnosis Description Type Department Provider    10/9/23 Shoulder Pain Acute pain of left shoulder ED to Hosp-Admission (Discharged) (ADMIT) [facility] Talia Lagos MD; Marcelo Hwang,... HIPAA compliant message left requesting a return phone call at patients convenience. Will continue to follow. Was this an external facility discharge? No Discharge Facility Name: [facility]     Noted following upcoming appointments from discharge chart review:   [facility] follow up appointment(s): No future appointments. Non-[facility]  follow up appointment(s): n/a      Katie TERRY, RN- [address]  [phone]  [email or web address]

## 2023-10-12 ENCOUNTER — CARE COORDINATION (OUTPATIENT)
Dept: OTHER | Facility: CLINIC | Age: 68
End: 2023-10-12

## 2023-10-12 ENCOUNTER — APPOINTMENT (OUTPATIENT)
Dept: CT IMAGING | Age: 68
End: 2023-10-12
Attending: STUDENT IN AN ORGANIZED HEALTH CARE EDUCATION/TRAINING PROGRAM
Payer: MEDICARE

## 2023-10-12 VITALS
SYSTOLIC BLOOD PRESSURE: 145 MMHG | TEMPERATURE: 98.6 F | HEIGHT: 69 IN | BODY MASS INDEX: 30.69 KG/M2 | OXYGEN SATURATION: 96 % | HEART RATE: 85 BPM | RESPIRATION RATE: 18 BRPM | WEIGHT: 207.23 LBS | DIASTOLIC BLOOD PRESSURE: 70 MMHG

## 2023-10-12 LAB
EKG ATRIAL RATE: 95 BPM
EKG P AXIS: 54 DEGREES
EKG P-R INTERVAL: 140 MS
EKG Q-T INTERVAL: 384 MS
EKG QRS DURATION: 88 MS
EKG QTC CALCULATION (BAZETT): 482 MS
EKG R AXIS: 46 DEGREES
EKG T AXIS: 41 DEGREES
EKG VENTRICULAR RATE: 95 BPM

## 2023-10-12 PROCEDURE — 96372 THER/PROPH/DIAG INJ SC/IM: CPT

## 2023-10-12 PROCEDURE — 6360000002 HC RX W HCPCS: Performed by: STUDENT IN AN ORGANIZED HEALTH CARE EDUCATION/TRAINING PROGRAM

## 2023-10-12 PROCEDURE — 96374 THER/PROPH/DIAG INJ IV PUSH: CPT

## 2023-10-12 PROCEDURE — 93010 ELECTROCARDIOGRAM REPORT: CPT | Performed by: INTERNAL MEDICINE

## 2023-10-12 PROCEDURE — 72125 CT NECK SPINE W/O DYE: CPT

## 2023-10-12 PROCEDURE — 6370000000 HC RX 637 (ALT 250 FOR IP): Performed by: STUDENT IN AN ORGANIZED HEALTH CARE EDUCATION/TRAINING PROGRAM

## 2023-10-12 RX ORDER — DEXAMETHASONE SODIUM PHOSPHATE 10 MG/ML
10 INJECTION INTRAMUSCULAR; INTRAVENOUS ONCE
Status: COMPLETED | OUTPATIENT
Start: 2023-10-12 | End: 2023-10-12

## 2023-10-12 RX ORDER — DIAZEPAM 2 MG/1
2 TABLET ORAL ONCE
Status: COMPLETED | OUTPATIENT
Start: 2023-10-12 | End: 2023-10-12

## 2023-10-12 RX ORDER — MORPHINE SULFATE 4 MG/ML
6 INJECTION, SOLUTION INTRAMUSCULAR; INTRAVENOUS ONCE
Status: COMPLETED | OUTPATIENT
Start: 2023-10-12 | End: 2023-10-12

## 2023-10-12 RX ORDER — ORPHENADRINE CITRATE 30 MG/ML
60 INJECTION INTRAMUSCULAR; INTRAVENOUS ONCE
Status: COMPLETED | OUTPATIENT
Start: 2023-10-12 | End: 2023-10-12

## 2023-10-12 RX ORDER — CYCLOBENZAPRINE HCL 5 MG
5 TABLET ORAL 2 TIMES DAILY PRN
Qty: 10 TABLET | Refills: 0 | Status: SHIPPED | OUTPATIENT
Start: 2023-10-12 | End: 2023-10-22

## 2023-10-12 RX ADMIN — ORPHENADRINE CITRATE 60 MG: 60 INJECTION INTRAMUSCULAR; INTRAVENOUS at 01:25

## 2023-10-12 RX ADMIN — DEXAMETHASONE SODIUM PHOSPHATE 10 MG: 10 INJECTION INTRAMUSCULAR; INTRAVENOUS at 01:24

## 2023-10-12 RX ADMIN — DIAZEPAM 2 MG: 2 TABLET ORAL at 00:11

## 2023-10-12 RX ADMIN — MORPHINE SULFATE 6 MG: 4 INJECTION, SOLUTION INTRAMUSCULAR; INTRAVENOUS at 01:25

## 2023-10-12 ASSESSMENT — PAIN - FUNCTIONAL ASSESSMENT: PAIN_FUNCTIONAL_ASSESSMENT: NONE - DENIES PAIN

## 2023-10-12 NOTE — ED PROVIDER NOTES
Troponin, High Sensitivity 31 (*)     All other components within normal limits   TROPONIN - Abnormal; Notable for the following components:    Troponin, High Sensitivity 31 (*)     All other components within normal limits   POTASSIUM - Abnormal; Notable for the following components:    Potassium 5.2 (*)     All other components within normal limits       As interpreted by me, the above displayed labs are abnormal. All other labs obtained during this visit were within normal range or not returned as of this dictation. RADIOLOGY:   Non-plain film images such as CT, Ultrasound and MRI are read by the radiologist.     Interpretation per the Radiologist below, if available at the time of this note:    CT CERVICAL SPINE WO CONTRAST   Final Result   1. No acute abnormality of the cervical spine. 2. Chronic degenerative spondylosis with mild central stenosis and severe   right foraminal stenosis C5-C6, and severe bilateral foraminal stenosis C4-C5. RECOMMENDATIONS:   Careful clinical correlation and follow up recommended. Consider additional   nonurgent characterization of cervical spondylosis by MRI. US DUP UPPER EXTREMITY LEFT VENOUS   Final Result   No evidence of DVT. CT SHOULDER LEFT WO CONTRAST    Result Date: 10/10/2023  EXAMINATION: CT OF THE LEFT SHOULDER WITHOUT CONTRAST 10/10/2023 1:53 pm TECHNIQUE: CT of the left shoulder was performed without the administration of intravenous contrast.  Multiplanar reformatted images are provided for review. Automated exposure control, iterative reconstruction, and/or weight based adjustment of the mA/kV was utilized to reduce the radiation dose to as low as reasonably achievable. COMPARISON: None.  HISTORY ORDERING SYSTEM PROVIDED HISTORY: L shoulder pain TECHNOLOGIST PROVIDED HISTORY: Reason for exam:->L shoulder pain What reading provider will be dictating this exam?->CRC FINDINGS: Bones: No acute fracture or subluxation identified within 5' 9\" (1.753 m)        Patient was given the following medications:  Medications   HYDROcodone-acetaminophen (NORCO) 5-325 MG per tablet 1 tablet (1 tablet Oral Given 10/11/23 2115)   diazePAM (VALIUM) tablet 2 mg (2 mg Oral Given 10/12/23 0011)   orphenadrine (NORFLEX) injection 60 mg (60 mg IntraMUSCular Given 10/12/23 0125)   dexamethasone (DECADRON) injection 10 mg (10 mg IntraMUSCular Given 10/12/23 0124)   morphine (PF) injection 6 mg (6 mg IntraVENous Given 10/12/23 0125)       ED Course as of 10/14/23 1117   Wed Oct 11, 2023   2158 EKG: This EKG is signed and interpreted by me. Rate: 95  Rhythm: Sinus  Interpretation: Normal sinus rhythm, normal axis, nonspecific ST changes throughout, prolonged QT, QTc is 482  Comparison: no previous EKG available    [KG]   u Oct 12, 2023   0003 Nursing staff informed me that patient refused CTA and states that he is not having back pain all the pain is coming from his back and he only wants a CT scan of his cervical spine. He does not want the contrast exposure at this time. [KG]      ED Course User Index  [KG] Ethan Velasco DO            Medical Decision Making/Differential Diagnosis:    CC/HPI Summary, Social Determinants of health, Records Reviewed, DDx, testing done/not done, ED Course, Reassessment, disposition considerations/shared decision making with patient, consults, disposition:        The patient is a 45-year-old male presents emerged department complaining of left-sided shoulder pain. Patient is hypertensive but otherwise hemodynamically stable, nontoxic, and in no acute distress. He is a former smoker which is the only known social detriment to his health. Prior records were reviewed and he recently just had a stress test performed our facility 2 days ago which was low risk for any cardiac issues. He had a full cardiac work-up performed during his recent hospital stay and all the records were reviewed from this recent admission.

## 2023-10-12 NOTE — CARE COORDINATION
Care Transitions Initial Follow Up Call    Call within 2 business days of discharge: Yes    Patient Current Location:  Home: [place] [address]    Care Transition Nurse contacted the patient by telephone to perform post hospital discharge assessment. Verified name and  with patient as identifiers. Provided introduction to self, and explanation of the Care Transition Nurse role. Patient: Sg Guzmán Patient : 1955   MRN: [id number]  Reason for Admission: arm paresthesia   Discharge Date: 10/12/23 RARS: No data recorded    Last Discharge Facility       Date Complaint Diagnosis Description Type Department Provider    10/11/23 Shoulder Pain Arm paresthesia, left . .. ED (DISCHARGE) [facility], DO            Was this an external facility discharge? No Discharge Facility: [facility]     Challenges to be reviewed by the provider   Additional needs identified to be addressed with provider: No  none               Method of communication with provider: none. Spoke briefly with patient. Rosalia Robertson he is doing great this morning no issues, minimal discomfort in his shoulder He has an Orthopedic referral. Pt has an [facility] PCP on board. Pt has cervical spine x ray ordered for today. Pt is independent at home said he has no needs at all and no questions for me today. He appreciated the follow up call today. ACM will sign off. Care Transition Nurse reviewed discharge instructions with patient who verbalized understanding. The patient was given an opportunity to ask questions and does not have any further questions or concerns at this time. Were discharge instructions available to patient? Yes. Reviewed appropriate site of care based on symptoms and resources available to patient including: PCP  Specialist. The patient agrees to contact the PCP office for questions related to their healthcare. Advance Care Planning:   Does patient have an Advance Directive: not on file.     Medication

## 2023-10-12 NOTE — ED NOTES
Per handoff report, SHANIA Gillette states that the Pt is refusing any tests involving contrast. ER MD Traci Ross has been updated by [employer].       Srinivas Mendosa RN  10/11/23 0274

## 2023-10-12 NOTE — PROGRESS NOTES
CLINICAL PHARMACY NOTE: MEDS TO BEDS    Total # of Prescriptions Filled: 1   The following medications were delivered to the patient:  Cyclobenzaprine 5 mg    Additional Documentation:   Pt picked up in the pharmacy at register

## 2023-10-12 NOTE — FLOWSHEET NOTE
Pt presents to the ED for evaluation of non traumatic left shoulder pain. Pt denies any chest pain, SOB or dizziness. Pt is alert and oriented, speaking in full sentences with no further complaints at this time. Wife is bedside with Pt.

## 2023-10-12 NOTE — ED NOTES
Radiology Procedure Waiver   Name: Lilly Biswas  : 1955  MRN: [id number]    Date:  10/11/23    Time: 9:56 PM EDT    Benefits of immediately proceeding with radiology exam(s) without pre-testing outweigh the risks or are not indicated as specified below and therefore the following is/are being waived:    [x] Benefits of immediate radiology exam(s) outweigh any risk. OR    Pre-exam testing is not indicated for the following reason(s):  [] Pregnancy test   [] Patients LMP on-time and regular.   [] Patient had Tubal Ligation or has other Contraception Device. [] Patient  is Menopausal or Premenarcheal.    [] Patient had Full or Partial Hysterectomy. [] Protocol for CT contrast allegry   [] Patient has tolerated well previously   [] Patient does not have a true allergy    [] MRI Questionnaire     [x] BUN/Creatinine   [] Patient age w/no hx of renal dysfunction. [x] Patient on Dialysis. [] Recent Normal Labs.   Electronically signed by Chaka Christiansen DO on 10/11/23 at 9:56 PM EDT               Chaka Christiansen DO  10/11/23 3129

## 2023-10-12 NOTE — ED NOTES
Pt care report received from [facility], assumed Pt care at this time. Pt currently being transported to CT. Pt has no complaints at this time.       Drea Lester RN  10/11/23 9227

## 2023-10-30 ENCOUNTER — HOSPITAL ENCOUNTER (INPATIENT)
Age: 68
LOS: 3 days | Discharge: HOME | DRG: 871 | End: 2023-11-02
Attending: EMERGENCY MEDICINE | Admitting: INTERNAL MEDICINE
Payer: COMMERCIAL

## 2023-10-30 ENCOUNTER — APPOINTMENT (OUTPATIENT)
Dept: GENERAL RADIOLOGY | Age: 68
DRG: 871 | End: 2023-10-30
Payer: COMMERCIAL

## 2023-10-30 ENCOUNTER — APPOINTMENT (OUTPATIENT)
Dept: CT IMAGING | Age: 68
DRG: 871 | End: 2023-10-30
Payer: COMMERCIAL

## 2023-10-30 DIAGNOSIS — J18.9 PNEUMONIA OF BOTH LUNGS DUE TO INFECTIOUS ORGANISM, UNSPECIFIED PART OF LUNG: ICD-10-CM

## 2023-10-30 DIAGNOSIS — E87.5 HYPERKALEMIA: ICD-10-CM

## 2023-10-30 DIAGNOSIS — J96.01 ACUTE RESPIRATORY FAILURE WITH HYPOXIA (HCC): Primary | ICD-10-CM

## 2023-10-30 LAB
ALBUMIN SERPL-MCNC: 4.2 G/DL (ref 3.5–5.2)
ALP SERPL-CCNC: 77 U/L (ref 40–129)
ALT SERPL-CCNC: 35 U/L (ref 0–40)
ANION GAP SERPL CALCULATED.3IONS-SCNC: 14 MMOL/L (ref 7–16)
ANION GAP SERPL CALCULATED.3IONS-SCNC: 18 MMOL/L (ref 7–16)
AST SERPL-CCNC: 17 U/L (ref 0–39)
B PARAP IS1001 DNA NPH QL NAA+NON-PROBE: NOT DETECTED
B PERT DNA SPEC QL NAA+PROBE: NOT DETECTED
B.E.: -5.6 MMOL/L (ref -3–3)
BASOPHILS # BLD: 0.22 K/UL (ref 0–0.2)
BASOPHILS NFR BLD: 1 % (ref 0–2)
BILIRUB SERPL-MCNC: 0.3 MG/DL (ref 0–1.2)
BNP SERPL-MCNC: ABNORMAL PG/ML (ref 0–125)
BUN SERPL-MCNC: 109 MG/DL (ref 6–23)
BUN SERPL-MCNC: 38 MG/DL (ref 6–23)
C PNEUM DNA NPH QL NAA+NON-PROBE: NOT DETECTED
CALCIUM SERPL-MCNC: 9.1 MG/DL (ref 8.6–10.2)
CALCIUM SERPL-MCNC: 9.8 MG/DL (ref 8.6–10.2)
CHLORIDE SERPL-SCNC: 94 MMOL/L (ref 98–107)
CHLORIDE SERPL-SCNC: 98 MMOL/L (ref 98–107)
CO2 SERPL-SCNC: 21 MMOL/L (ref 22–29)
CO2 SERPL-SCNC: 28 MMOL/L (ref 22–29)
COHB: 1.6 % (ref 0–1.5)
CREAT SERPL-MCNC: 4.3 MG/DL (ref 0.7–1.2)
CREAT SERPL-MCNC: 9.6 MG/DL (ref 0.7–1.2)
CRITICAL: ABNORMAL
D DIMER: 784 NG/ML DDU (ref 0–232)
DATE ANALYZED: ABNORMAL
DATE OF COLLECTION: ABNORMAL
EKG ATRIAL RATE: 90 BPM
EKG P AXIS: 52 DEGREES
EKG P-R INTERVAL: 136 MS
EKG Q-T INTERVAL: 370 MS
EKG QRS DURATION: 94 MS
EKG QTC CALCULATION (BAZETT): 452 MS
EKG R AXIS: 31 DEGREES
EKG T AXIS: 63 DEGREES
EKG VENTRICULAR RATE: 90 BPM
EOSINOPHIL # BLD: 0 K/UL (ref 0.05–0.5)
EOSINOPHILS RELATIVE PERCENT: 0 % (ref 0–6)
ERYTHROCYTE [DISTWIDTH] IN BLOOD BY AUTOMATED COUNT: 15.4 % (ref 11.5–15)
FLUAV RNA NPH QL NAA+NON-PROBE: NOT DETECTED
FLUBV RNA NPH QL NAA+NON-PROBE: NOT DETECTED
GFR SERPL CREATININE-BSD FRML MDRD: 14 ML/MIN/1.73M2
GFR SERPL CREATININE-BSD FRML MDRD: 5 ML/MIN/1.73M2
GLUCOSE BLD-MCNC: 152 MG/DL (ref 74–99)
GLUCOSE SERPL-MCNC: 105 MG/DL (ref 74–99)
GLUCOSE SERPL-MCNC: 150 MG/DL (ref 74–99)
HADV DNA NPH QL NAA+NON-PROBE: NOT DETECTED
HCO3: 18.3 MMOL/L (ref 22–26)
HCOV 229E RNA NPH QL NAA+NON-PROBE: NOT DETECTED
HCOV HKU1 RNA NPH QL NAA+NON-PROBE: NOT DETECTED
HCOV NL63 RNA NPH QL NAA+NON-PROBE: NOT DETECTED
HCOV OC43 RNA NPH QL NAA+NON-PROBE: NOT DETECTED
HCT VFR BLD AUTO: 25.6 % (ref 37–54)
HGB BLD-MCNC: 8.3 G/DL (ref 12.5–16.5)
HHB: 7.9 % (ref 0–5)
HMPV RNA NPH QL NAA+NON-PROBE: NOT DETECTED
HPIV1 RNA NPH QL NAA+NON-PROBE: NOT DETECTED
HPIV2 RNA NPH QL NAA+NON-PROBE: NOT DETECTED
HPIV3 RNA NPH QL NAA+NON-PROBE: NOT DETECTED
HPIV4 RNA NPH QL NAA+NON-PROBE: NOT DETECTED
LAB: ABNORMAL
LACTATE BLDV-SCNC: 1.2 MMOL/L (ref 0.5–1.9)
LACTATE BLDV-SCNC: 1.2 MMOL/L (ref 0.5–1.9)
LYMPHOCYTES NFR BLD: 0.87 K/UL (ref 1.5–4)
LYMPHOCYTES RELATIVE PERCENT: 4 % (ref 20–42)
Lab: 645
M PNEUMO DNA NPH QL NAA+NON-PROBE: NOT DETECTED
MCH RBC QN AUTO: 31.7 PG (ref 26–35)
MCHC RBC AUTO-ENTMCNC: 32.4 G/DL (ref 32–34.5)
MCV RBC AUTO: 97.7 FL (ref 80–99.9)
METAMYELOCYTES ABSOLUTE COUNT: 0.43 K/UL (ref 0–0.12)
METAMYELOCYTES: 2 % (ref 0–1)
METHB: 0.3 % (ref 0–1.5)
MODE: ABNORMAL
MONOCYTES NFR BLD: 0.22 K/UL (ref 0.1–0.95)
MONOCYTES NFR BLD: 1 % (ref 2–12)
MYELOCYTES ABSOLUTE COUNT: 0.43 K/UL
MYELOCYTES: 2 %
NEUTROPHILS NFR BLD: 91 % (ref 43–80)
NEUTS SEG NFR BLD: 22.53 K/UL (ref 1.8–7.3)
NUCLEATED RED BLOOD CELLS: 1 PER 100 WBC
O2 CONTENT: 11.2 ML/DL
O2 SATURATION: 91.9 % (ref 92–98.5)
O2HB: 90.2 % (ref 94–97)
OPERATOR ID: 2860
PATIENT TEMP: 37 C
PCO2: 29.8 MMHG (ref 35–45)
PH BLOOD GAS: 7.41 (ref 7.35–7.45)
PLATELET # BLD AUTO: 414 K/UL (ref 130–450)
PMV BLD AUTO: 9.7 FL (ref 7–12)
PO2: 64.7 MMHG (ref 75–100)
POTASSIUM SERPL-SCNC: 3.6 MMOL/L (ref 3.5–5)
POTASSIUM SERPL-SCNC: 7 MMOL/L (ref 3.5–5)
PROT SERPL-MCNC: 6.9 G/DL (ref 6.4–8.3)
RBC # BLD AUTO: 2.62 M/UL (ref 3.8–5.8)
RBC # BLD: ABNORMAL 10*6/UL
RBC # BLD: ABNORMAL 10*6/UL
RSV RNA NPH QL NAA+NON-PROBE: NOT DETECTED
RV+EV RNA NPH QL NAA+NON-PROBE: NOT DETECTED
SARS-COV-2 RNA NPH QL NAA+NON-PROBE: NOT DETECTED
SODIUM SERPL-SCNC: 136 MMOL/L (ref 132–146)
SODIUM SERPL-SCNC: 137 MMOL/L (ref 132–146)
SOURCE, BLOOD GAS: ABNORMAL
SPECIMEN DESCRIPTION: NORMAL
THB: 8.8 G/DL (ref 11.5–16.5)
TIME ANALYZED: 652
TROPONIN I SERPL HS-MCNC: 40 NG/L (ref 0–11)
WBC OTHER # BLD: 24.7 K/UL (ref 4.5–11.5)

## 2023-10-30 PROCEDURE — 84484 ASSAY OF TROPONIN QUANT: CPT

## 2023-10-30 PROCEDURE — 6370000000 HC RX 637 (ALT 250 FOR IP): Performed by: EMERGENCY MEDICINE

## 2023-10-30 PROCEDURE — 93005 ELECTROCARDIOGRAM TRACING: CPT | Performed by: EMERGENCY MEDICINE

## 2023-10-30 PROCEDURE — 2580000003 HC RX 258: Performed by: EMERGENCY MEDICINE

## 2023-10-30 PROCEDURE — 90935 HEMODIALYSIS ONE EVALUATION: CPT

## 2023-10-30 PROCEDURE — 6370000000 HC RX 637 (ALT 250 FOR IP): Performed by: NURSE PRACTITIONER

## 2023-10-30 PROCEDURE — 99285 EMERGENCY DEPT VISIT HI MDM: CPT

## 2023-10-30 PROCEDURE — 85025 COMPLETE CBC W/AUTO DIFF WBC: CPT

## 2023-10-30 PROCEDURE — 87449 NOS EACH ORGANISM AG IA: CPT

## 2023-10-30 PROCEDURE — 71045 X-RAY EXAM CHEST 1 VIEW: CPT

## 2023-10-30 PROCEDURE — 94640 AIRWAY INHALATION TREATMENT: CPT

## 2023-10-30 PROCEDURE — 83605 ASSAY OF LACTIC ACID: CPT

## 2023-10-30 PROCEDURE — 85379 FIBRIN DEGRADATION QUANT: CPT

## 2023-10-30 PROCEDURE — 82805 BLOOD GASES W/O2 SATURATION: CPT

## 2023-10-30 PROCEDURE — 6360000002 HC RX W HCPCS: Performed by: EMERGENCY MEDICINE

## 2023-10-30 PROCEDURE — 80053 COMPREHEN METABOLIC PANEL: CPT

## 2023-10-30 PROCEDURE — 2580000003 HC RX 258: Performed by: NURSE PRACTITIONER

## 2023-10-30 PROCEDURE — 2060000000 HC ICU INTERMEDIATE R&B

## 2023-10-30 PROCEDURE — 80048 BASIC METABOLIC PNL TOTAL CA: CPT

## 2023-10-30 PROCEDURE — 83880 ASSAY OF NATRIURETIC PEPTIDE: CPT

## 2023-10-30 PROCEDURE — 96374 THER/PROPH/DIAG INJ IV PUSH: CPT

## 2023-10-30 PROCEDURE — 6360000002 HC RX W HCPCS: Performed by: NURSE PRACTITIONER

## 2023-10-30 PROCEDURE — 6360000004 HC RX CONTRAST MEDICATION: Performed by: RADIOLOGY

## 2023-10-30 PROCEDURE — 87899 AGENT NOS ASSAY W/OPTIC: CPT

## 2023-10-30 PROCEDURE — 2500000003 HC RX 250 WO HCPCS: Performed by: EMERGENCY MEDICINE

## 2023-10-30 PROCEDURE — 82962 GLUCOSE BLOOD TEST: CPT

## 2023-10-30 PROCEDURE — 0202U NFCT DS 22 TRGT SARS-COV-2: CPT

## 2023-10-30 PROCEDURE — 93010 ELECTROCARDIOGRAM REPORT: CPT | Performed by: INTERNAL MEDICINE

## 2023-10-30 PROCEDURE — 87040 BLOOD CULTURE FOR BACTERIA: CPT

## 2023-10-30 PROCEDURE — 71275 CT ANGIOGRAPHY CHEST: CPT

## 2023-10-30 RX ORDER — IPRATROPIUM BROMIDE AND ALBUTEROL SULFATE 2.5; .5 MG/3ML; MG/3ML
1 SOLUTION RESPIRATORY (INHALATION) EVERY 4 HOURS PRN
Status: DISCONTINUED | OUTPATIENT
Start: 2023-10-30 | End: 2023-11-02 | Stop reason: HOSPADM

## 2023-10-30 RX ORDER — SODIUM CHLORIDE 9 MG/ML
INJECTION, SOLUTION INTRAVENOUS PRN
Status: DISCONTINUED | OUTPATIENT
Start: 2023-10-30 | End: 2023-11-02 | Stop reason: HOSPADM

## 2023-10-30 RX ORDER — DEXTROSE MONOHYDRATE 25 G/50ML
25 INJECTION, SOLUTION INTRAVENOUS ONCE
Status: COMPLETED | OUTPATIENT
Start: 2023-10-30 | End: 2023-10-30

## 2023-10-30 RX ORDER — ONDANSETRON 4 MG/1
4 TABLET, ORALLY DISINTEGRATING ORAL EVERY 8 HOURS PRN
Status: DISCONTINUED | OUTPATIENT
Start: 2023-10-30 | End: 2023-11-02 | Stop reason: HOSPADM

## 2023-10-30 RX ORDER — IPRATROPIUM BROMIDE AND ALBUTEROL SULFATE 2.5; .5 MG/3ML; MG/3ML
1 SOLUTION RESPIRATORY (INHALATION)
Status: COMPLETED | OUTPATIENT
Start: 2023-10-30 | End: 2023-10-30

## 2023-10-30 RX ORDER — DOXYCYCLINE HYCLATE 100 MG/1
100 CAPSULE ORAL EVERY 12 HOURS SCHEDULED
Status: DISCONTINUED | OUTPATIENT
Start: 2023-10-30 | End: 2023-10-30

## 2023-10-30 RX ORDER — CALCIUM GLUCONATE 10 MG/ML
1000 INJECTION, SOLUTION INTRAVENOUS ONCE
Status: COMPLETED | OUTPATIENT
Start: 2023-10-30 | End: 2023-10-30

## 2023-10-30 RX ORDER — AMLODIPINE BESYLATE 10 MG/1
10 TABLET ORAL DAILY
Status: DISCONTINUED | OUTPATIENT
Start: 2023-10-30 | End: 2023-11-02 | Stop reason: HOSPADM

## 2023-10-30 RX ORDER — ATORVASTATIN CALCIUM 40 MG/1
40 TABLET, FILM COATED ORAL DAILY
Status: DISCONTINUED | OUTPATIENT
Start: 2023-10-30 | End: 2023-11-02 | Stop reason: HOSPADM

## 2023-10-30 RX ORDER — HYDRALAZINE HYDROCHLORIDE 50 MG/1
100 TABLET, FILM COATED ORAL 2 TIMES DAILY
Status: DISCONTINUED | OUTPATIENT
Start: 2023-10-30 | End: 2023-11-02 | Stop reason: HOSPADM

## 2023-10-30 RX ORDER — SODIUM CHLORIDE 0.9 % (FLUSH) 0.9 %
10 SYRINGE (ML) INJECTION PRN
Status: DISCONTINUED | OUTPATIENT
Start: 2023-10-30 | End: 2023-11-02 | Stop reason: HOSPADM

## 2023-10-30 RX ORDER — ONDANSETRON 2 MG/ML
4 INJECTION INTRAMUSCULAR; INTRAVENOUS EVERY 6 HOURS PRN
Status: DISCONTINUED | OUTPATIENT
Start: 2023-10-30 | End: 2023-11-02 | Stop reason: HOSPADM

## 2023-10-30 RX ORDER — ASPIRIN 325 MG
325 TABLET ORAL DAILY
Status: DISCONTINUED | OUTPATIENT
Start: 2023-10-30 | End: 2023-11-02 | Stop reason: HOSPADM

## 2023-10-30 RX ORDER — VALSARTAN 160 MG/1
160 TABLET ORAL NIGHTLY
Status: DISCONTINUED | OUTPATIENT
Start: 2023-10-30 | End: 2023-11-02 | Stop reason: HOSPADM

## 2023-10-30 RX ORDER — HEPARIN SODIUM 10000 [USP'U]/ML
5000 INJECTION, SOLUTION INTRAVENOUS; SUBCUTANEOUS EVERY 8 HOURS
Status: DISCONTINUED | OUTPATIENT
Start: 2023-10-30 | End: 2023-11-02 | Stop reason: HOSPADM

## 2023-10-30 RX ORDER — SODIUM CHLORIDE 0.9 % (FLUSH) 0.9 %
5-40 SYRINGE (ML) INJECTION EVERY 12 HOURS SCHEDULED
Status: DISCONTINUED | OUTPATIENT
Start: 2023-10-30 | End: 2023-11-02 | Stop reason: HOSPADM

## 2023-10-30 RX ORDER — LORAZEPAM 0.5 MG/1
0.5 TABLET ORAL ONCE
Status: DISCONTINUED | OUTPATIENT
Start: 2023-10-30 | End: 2023-11-02 | Stop reason: HOSPADM

## 2023-10-30 RX ORDER — ACETAMINOPHEN 325 MG/1
650 TABLET ORAL EVERY 6 HOURS PRN
Status: DISCONTINUED | OUTPATIENT
Start: 2023-10-30 | End: 2023-11-02 | Stop reason: HOSPADM

## 2023-10-30 RX ORDER — ACETAMINOPHEN 650 MG/1
650 SUPPOSITORY RECTAL EVERY 6 HOURS PRN
Status: DISCONTINUED | OUTPATIENT
Start: 2023-10-30 | End: 2023-11-02 | Stop reason: HOSPADM

## 2023-10-30 RX ORDER — METOPROLOL TARTRATE 50 MG/1
50 TABLET, FILM COATED ORAL 2 TIMES DAILY
Status: DISCONTINUED | OUTPATIENT
Start: 2023-10-30 | End: 2023-11-02 | Stop reason: HOSPADM

## 2023-10-30 RX ORDER — SODIUM CHLORIDE 0.9 % (FLUSH) 0.9 %
10 SYRINGE (ML) INJECTION
Status: ACTIVE | OUTPATIENT
Start: 2023-10-30 | End: 2023-10-31

## 2023-10-30 RX ADMIN — IPRATROPIUM BROMIDE AND ALBUTEROL SULFATE 1 DOSE: 2.5; .5 SOLUTION RESPIRATORY (INHALATION) at 05:18

## 2023-10-30 RX ADMIN — IPRATROPIUM BROMIDE AND ALBUTEROL SULFATE 1 DOSE: 2.5; .5 SOLUTION RESPIRATORY (INHALATION) at 05:20

## 2023-10-30 RX ADMIN — ATORVASTATIN CALCIUM 40 MG: 40 TABLET, FILM COATED ORAL at 21:02

## 2023-10-30 RX ADMIN — CEFEPIME 2000 MG: 2 INJECTION, POWDER, FOR SOLUTION INTRAVENOUS at 08:29

## 2023-10-30 RX ADMIN — DEXTROSE MONOHYDRATE 25 G: 25 INJECTION, SOLUTION INTRAVENOUS at 08:18

## 2023-10-30 RX ADMIN — AMLODIPINE BESYLATE 10 MG: 10 TABLET ORAL at 17:24

## 2023-10-30 RX ADMIN — IOPAMIDOL 75 ML: 755 INJECTION, SOLUTION INTRAVENOUS at 07:04

## 2023-10-30 RX ADMIN — VALSARTAN 160 MG: 160 TABLET ORAL at 21:02

## 2023-10-30 RX ADMIN — HYDRALAZINE HYDROCHLORIDE 100 MG: 50 TABLET, FILM COATED ORAL at 21:02

## 2023-10-30 RX ADMIN — METOPROLOL TARTRATE 50 MG: 50 TABLET ORAL at 21:02

## 2023-10-30 RX ADMIN — WATER 1000 MG: 1 INJECTION INTRAMUSCULAR; INTRAVENOUS; SUBCUTANEOUS at 17:27

## 2023-10-30 RX ADMIN — IPRATROPIUM BROMIDE AND ALBUTEROL SULFATE 1 DOSE: 2.5; .5 SOLUTION RESPIRATORY (INHALATION) at 05:19

## 2023-10-30 RX ADMIN — VANCOMYCIN HYDROCHLORIDE 1000 MG: 1 INJECTION, POWDER, LYOPHILIZED, FOR SOLUTION INTRAVENOUS at 14:38

## 2023-10-30 RX ADMIN — CALCIUM GLUCONATE 1000 MG: 10 INJECTION, SOLUTION INTRAVENOUS at 06:57

## 2023-10-30 RX ADMIN — INSULIN HUMAN 6 UNITS: 100 INJECTION, SOLUTION PARENTERAL at 08:18

## 2023-10-30 ASSESSMENT — PAIN SCALES - GENERAL: PAINLEVEL_OUTOF10: 3

## 2023-10-30 ASSESSMENT — PATIENT HEALTH QUESTIONNAIRE - PHQ9
SUM OF ALL RESPONSES TO PHQ QUESTIONS 1-9: 0
SUM OF ALL RESPONSES TO PHQ9 QUESTIONS 1 & 2: 0
SUM OF ALL RESPONSES TO PHQ QUESTIONS 1-9: 0
1. LITTLE INTEREST OR PLEASURE IN DOING THINGS: 0
SUM OF ALL RESPONSES TO PHQ QUESTIONS 1-9: 0
2. FEELING DOWN, DEPRESSED OR HOPELESS: 0
SUM OF ALL RESPONSES TO PHQ QUESTIONS 1-9: 0

## 2023-10-30 ASSESSMENT — PAIN - FUNCTIONAL ASSESSMENT
PAIN_FUNCTIONAL_ASSESSMENT: 0-10
PAIN_FUNCTIONAL_ASSESSMENT: 0-10

## 2023-10-30 ASSESSMENT — PAIN DESCRIPTION - DESCRIPTORS: DESCRIPTORS: DISCOMFORT

## 2023-10-30 ASSESSMENT — PAIN DESCRIPTION - LOCATION: LOCATION: CHEST

## 2023-10-30 ASSESSMENT — PAIN DESCRIPTION - ORIENTATION: ORIENTATION: MID

## 2023-10-30 NOTE — ED NOTES
Radiology Procedure Waiver   Name: Laura Barba  : 1955  MRN: [id number]    Date:  10/30/23    Time: 5:18 AM EDT    Benefits of immediately proceeding with Radiology exam(s) without pre-testing outweigh the risks or are not indicated as specified below and therefore the following is/are being waived:    [] Pregnancy test   [] Patients LMP on-time and regular.   [] Patient had Tubal Ligation or has other Contraception Device. [] Patient  is Menopausal or Premenarcheal.    [] Patient had Full or Partial Hysterectomy. [] Protocol for Iodine allergy    [] MRI Questionnaire     [x] BUN/Creatinine   [] Patient age w/no hx of renal dysfunction. [] Patient on Dialysis. [] Recent Normal Labs.   Electronically signed by Dipti Christianson MD on 10/30/23 at 5:18 AM EDT               Dipti Christianson MD  10/30/23 6101

## 2023-10-30 NOTE — ED NOTES
Blood cultures obtained from right forearm per policy. Set two of two drawn at this time by Royal Norberto hester lpn.            Mariam Lundberg LPN  24/69/17 8655

## 2023-10-30 NOTE — ED NOTES
Patient and spouse aware the need for a urine sample.       Ana Hernandez, [address]  10/30/23 0033

## 2023-10-30 NOTE — ED NOTES
Blood cultures obtained from right hand per policy. Set one of two drawn at this time by Andrew hester lpn.            Alma Albright LPN  54/05/00 9661

## 2023-10-30 NOTE — H&P
[facility]  History and Physical      CHIEF COMPLAINT:    Chief Complaint   Patient presents with    Chest Pain     Intermittent mid CP x 1 week. +SOB; worse on excertion. +cough        Patient of Fatih Burden DO presents with:  Pneumonia, unspecified organism    History of Present Illness:   Patient is a 51-year-old male with a past medical history of CVA, ESRD on HD, COPD, blood clots, hypertension who presents to the emergency room for chest pain with shortness of breath. He denies any chest pain but has been having  shortness of breath on exertion. Patient states he has been experiencing a productive cough. He denies any fevers or chills. On arrival to emergency patient had a chest x-ray which revealed moderate bilateral lung infiltrates. A CTA was obtained which revealed no evidence of PE however extensive multifocal bilateral pneumonia with small bilateral pleural effusions. Labs on arrival revealed hyperkalemia of 7.0, elevated BUN/creatinine of 109/9.6, proBNP of 19,992, leukocytosis of 24.7, D-dimer of 784. Patient is requiring 2 L of supplemental O2 to maintain adequate oxygenation. Hyperkalemia protocol was initiated for correction of hyperkalemia of 7.0. Nephrology was consulted in the emergency room and patient is scheduled for hemodialysis today. Patient is admitted to intermediate telemetry and for further work-up and treatment. I evaluated him in the emergency room. He is awake alert oriented x4 and able to answer questions appropriately. Case discussed at length with wife Clara Arias at bedside, recent hospitalization discussed all history taken. REVIEW OF SYSTEMS:  Pertinent negatives are above in HPI. 10 point ROS otherwise negative.       Past Medical History:   Diagnosis Date    Cerebral artery occlusion with cerebral infarction ([address]) 2017    balance off    CKD (chronic kidney disease), stage IV (HCC)     COPD (chronic obstructive pulmonary disease) ([address]) child(michelle)

## 2023-10-31 LAB
ANION GAP SERPL CALCULATED.3IONS-SCNC: 10 MMOL/L (ref 7–16)
BASOPHILS # BLD: 0.03 K/UL (ref 0–0.2)
BASOPHILS NFR BLD: 0 % (ref 0–2)
BUN SERPL-MCNC: 62 MG/DL (ref 6–23)
CALCIUM SERPL-MCNC: 9 MG/DL (ref 8.6–10.2)
CHLORIDE SERPL-SCNC: 97 MMOL/L (ref 98–107)
CO2 SERPL-SCNC: 30 MMOL/L (ref 22–29)
CREAT SERPL-MCNC: 6.7 MG/DL (ref 0.7–1.2)
EOSINOPHIL # BLD: 0.02 K/UL (ref 0.05–0.5)
EOSINOPHILS RELATIVE PERCENT: 0 % (ref 0–6)
ERYTHROCYTE [DISTWIDTH] IN BLOOD BY AUTOMATED COUNT: 15.5 % (ref 11.5–15)
GFR SERPL CREATININE-BSD FRML MDRD: 8 ML/MIN/1.73M2
GLUCOSE SERPL-MCNC: 127 MG/DL (ref 74–99)
HCT VFR BLD AUTO: 23.3 % (ref 37–54)
HGB BLD-MCNC: 7.4 G/DL (ref 12.5–16.5)
IMM GRANULOCYTES # BLD AUTO: 0.49 K/UL (ref 0–0.58)
IMM GRANULOCYTES NFR BLD: 3 % (ref 0–5)
L PNEUMO1 AG UR QL IA.RAPID: NEGATIVE
LYMPHOCYTES NFR BLD: 0.92 K/UL (ref 1.5–4)
LYMPHOCYTES RELATIVE PERCENT: 5 % (ref 20–42)
MAGNESIUM SERPL-MCNC: 1.7 MG/DL (ref 1.6–2.6)
MCH RBC QN AUTO: 31 PG (ref 26–35)
MCHC RBC AUTO-ENTMCNC: 31.8 G/DL (ref 32–34.5)
MCV RBC AUTO: 97.5 FL (ref 80–99.9)
MONOCYTES NFR BLD: 0.96 K/UL (ref 0.1–0.95)
MONOCYTES NFR BLD: 6 % (ref 2–12)
NEUTROPHILS NFR BLD: 86 % (ref 43–80)
NEUTS SEG NFR BLD: 15.09 K/UL (ref 1.8–7.3)
PHOSPHATE SERPL-MCNC: 5.5 MG/DL (ref 2.5–4.5)
PLATELET # BLD AUTO: 347 K/UL (ref 130–450)
PMV BLD AUTO: 9.6 FL (ref 7–12)
POTASSIUM SERPL-SCNC: 6.2 MMOL/L (ref 3.5–5)
PROCALCITONIN SERPL-MCNC: 0.59 NG/ML (ref 0–0.08)
RBC # BLD AUTO: 2.39 M/UL (ref 3.8–5.8)
S PNEUM AG SPEC QL: NEGATIVE
SODIUM SERPL-SCNC: 137 MMOL/L (ref 132–146)
WBC OTHER # BLD: 17.5 K/UL (ref 4.5–11.5)

## 2023-10-31 PROCEDURE — 83735 ASSAY OF MAGNESIUM: CPT

## 2023-10-31 PROCEDURE — 6370000000 HC RX 637 (ALT 250 FOR IP): Performed by: NURSE PRACTITIONER

## 2023-10-31 PROCEDURE — 90935 HEMODIALYSIS ONE EVALUATION: CPT

## 2023-10-31 PROCEDURE — 84100 ASSAY OF PHOSPHORUS: CPT

## 2023-10-31 PROCEDURE — 93306 TTE W/DOPPLER COMPLETE: CPT

## 2023-10-31 PROCEDURE — 2580000003 HC RX 258: Performed by: NURSE PRACTITIONER

## 2023-10-31 PROCEDURE — 84145 PROCALCITONIN (PCT): CPT

## 2023-10-31 PROCEDURE — 80048 BASIC METABOLIC PNL TOTAL CA: CPT

## 2023-10-31 PROCEDURE — 2580000003 HC RX 258: Performed by: INTERNAL MEDICINE

## 2023-10-31 PROCEDURE — 85025 COMPLETE CBC W/AUTO DIFF WBC: CPT

## 2023-10-31 PROCEDURE — 36415 COLL VENOUS BLD VENIPUNCTURE: CPT

## 2023-10-31 PROCEDURE — 6360000002 HC RX W HCPCS: Performed by: NURSE PRACTITIONER

## 2023-10-31 PROCEDURE — 6360000002 HC RX W HCPCS: Performed by: INTERNAL MEDICINE

## 2023-10-31 PROCEDURE — 2060000000 HC ICU INTERMEDIATE R&B

## 2023-10-31 PROCEDURE — 2700000000 HC OXYGEN THERAPY PER DAY

## 2023-10-31 RX ADMIN — AMLODIPINE BESYLATE 10 MG: 10 TABLET ORAL at 08:47

## 2023-10-31 RX ADMIN — METOPROLOL TARTRATE 50 MG: 50 TABLET ORAL at 20:38

## 2023-10-31 RX ADMIN — HEPARIN SODIUM 5000 UNITS: 10000 INJECTION INTRAVENOUS; SUBCUTANEOUS at 20:37

## 2023-10-31 RX ADMIN — HYDRALAZINE HYDROCHLORIDE 100 MG: 50 TABLET, FILM COATED ORAL at 08:46

## 2023-10-31 RX ADMIN — ASPIRIN 325 MG: 325 TABLET ORAL at 08:46

## 2023-10-31 RX ADMIN — HYDRALAZINE HYDROCHLORIDE 100 MG: 50 TABLET, FILM COATED ORAL at 20:38

## 2023-10-31 RX ADMIN — SODIUM CHLORIDE, PRESERVATIVE FREE 10 ML: 5 INJECTION INTRAVENOUS at 20:38

## 2023-10-31 RX ADMIN — HEPARIN SODIUM 5000 UNITS: 10000 INJECTION INTRAVENOUS; SUBCUTANEOUS at 06:10

## 2023-10-31 RX ADMIN — CEFEPIME 1000 MG: 1 INJECTION, POWDER, FOR SOLUTION INTRAMUSCULAR; INTRAVENOUS at 08:53

## 2023-10-31 RX ADMIN — ATORVASTATIN CALCIUM 40 MG: 40 TABLET, FILM COATED ORAL at 20:38

## 2023-10-31 RX ADMIN — SODIUM CHLORIDE, PRESERVATIVE FREE 10 ML: 5 INJECTION INTRAVENOUS at 08:47

## 2023-10-31 RX ADMIN — METOPROLOL TARTRATE 50 MG: 50 TABLET ORAL at 08:47

## 2023-10-31 RX ADMIN — VALSARTAN 160 MG: 160 TABLET ORAL at 20:38

## 2023-10-31 RX ADMIN — HEPARIN SODIUM 5000 UNITS: 10000 INJECTION INTRAVENOUS; SUBCUTANEOUS at 16:29

## 2023-10-31 ASSESSMENT — PAIN SCALES - GENERAL
PAINLEVEL_OUTOF10: 0

## 2023-10-31 NOTE — ACP (ADVANCE CARE PLANNING)
Advance Care Planning   Healthcare Decision Maker:    Primary Decision Maker: Yolande Russelllucia [facility] - [phone]    Click here to complete Healthcare Decision Makers including selection of the Healthcare Decision Maker Relationship (ie \"Primary\").

## 2023-10-31 NOTE — PLAN OF CARE
Problem: Discharge Planning  Goal: Discharge to home or other facility with appropriate resources  Outcome: Progressing  Flowsheets (Taken 10/31/2023 0215)  Discharge to home or other facility with appropriate resources: Identify barriers to discharge with patient and caregiver     Problem: Safety - Adult  Goal: Free from fall injury  Outcome: Progressing     Problem: ABCDS Injury Assessment  Goal: Absence of physical injury  Outcome: Progressing     Problem: Respiratory - Adult  Goal: Achieves optimal ventilation and oxygenation  Outcome: Progressing     Problem: Cardiovascular - Adult  Goal: Maintains optimal cardiac output and hemodynamic stability  Outcome: Progressing  Goal: Absence of cardiac dysrhythmias or at baseline  Outcome: Progressing     Problem: Infection - Adult  Goal: Absence of infection at discharge  Outcome: Progressing  Goal: Absence of infection during hospitalization  Outcome: Progressing  Goal: Absence of fever/infection during anticipated neutropenic period  Outcome: Progressing
Problem: Discharge Planning  Goal: Discharge to home or other facility with appropriate resources  Recent Flowsheet Documentation  Taken 10/31/2023 0215 by Edyta Godinez RN  Discharge to home or other facility with appropriate resources: Identify barriers to discharge with patient and caregiver     Problem: Discharge Planning  Goal: Discharge to home or other facility with appropriate resources  Recent Flowsheet Documentation  Taken 10/31/2023 0215 by Edtya Godinez RN  Discharge to home or other facility with appropriate resources: Identify barriers to discharge with patient and caregiver
RN  Absence of infection at discharge: Assess and monitor for signs and symptoms of infection  10/31/2023 0344 by Jon Srinivasan RN  Outcome: Progressing  Goal: Absence of infection during hospitalization  10/31/2023 1042 by Eden Wilson RN  Outcome: Progressing  Flowsheets (Taken 10/31/2023 0845)  Absence of infection during hospitalization: Assess and monitor for signs and symptoms of infection  10/31/2023 0344 by Jon Srinivasan RN  Outcome: Progressing  Goal: Absence of fever/infection during anticipated neutropenic period  Recent Flowsheet Documentation  Taken 10/31/2023 0845 by Eden Wilson RN  Absence of fever/infection during anticipated neutropenic period: Monitor white blood cell count  10/31/2023 0344 by Jon Srinivasan RN  Outcome: Progressing

## 2023-11-01 ENCOUNTER — APPOINTMENT (OUTPATIENT)
Dept: MRI IMAGING | Age: 68
DRG: 871 | End: 2023-11-01
Payer: COMMERCIAL

## 2023-11-01 LAB
ANION GAP SERPL CALCULATED.3IONS-SCNC: 14 MMOL/L (ref 7–16)
BASOPHILS # BLD: 0 K/UL (ref 0–0.2)
BASOPHILS NFR BLD: 0 % (ref 0–2)
BUN SERPL-MCNC: 62 MG/DL (ref 6–23)
CALCIUM SERPL-MCNC: 8.9 MG/DL (ref 8.6–10.2)
CHLORIDE SERPL-SCNC: 96 MMOL/L (ref 98–107)
CO2 SERPL-SCNC: 22 MMOL/L (ref 22–29)
CREAT SERPL-MCNC: 6.6 MG/DL (ref 0.7–1.2)
EOSINOPHIL # BLD: 0 K/UL (ref 0.05–0.5)
EOSINOPHILS RELATIVE PERCENT: 0 % (ref 0–6)
ERYTHROCYTE [DISTWIDTH] IN BLOOD BY AUTOMATED COUNT: 15.5 % (ref 11.5–15)
GFR SERPL CREATININE-BSD FRML MDRD: 9 ML/MIN/1.73M2
GLUCOSE SERPL-MCNC: 130 MG/DL (ref 74–99)
HCT VFR BLD AUTO: 24.5 % (ref 37–54)
HGB BLD-MCNC: 7.6 G/DL (ref 12.5–16.5)
LYMPHOCYTES NFR BLD: 0.31 K/UL (ref 1.5–4)
LYMPHOCYTES RELATIVE PERCENT: 2 % (ref 20–42)
MAGNESIUM SERPL-MCNC: 1.8 MG/DL (ref 1.6–2.6)
MCH RBC QN AUTO: 31 PG (ref 26–35)
MCHC RBC AUTO-ENTMCNC: 31 G/DL (ref 32–34.5)
MCV RBC AUTO: 100 FL (ref 80–99.9)
MONOCYTES NFR BLD: 0 % (ref 2–12)
MONOCYTES NFR BLD: 0 K/UL (ref 0.1–0.95)
NEUTROPHILS NFR BLD: 98 % (ref 43–80)
NEUTS SEG NFR BLD: 16.79 K/UL (ref 1.8–7.3)
PHOSPHATE SERPL-MCNC: 4.8 MG/DL (ref 2.5–4.5)
PLATELET # BLD AUTO: 376 K/UL (ref 130–450)
PMV BLD AUTO: 10.1 FL (ref 7–12)
POTASSIUM SERPL-SCNC: 6.5 MMOL/L (ref 3.5–5)
RBC # BLD AUTO: 2.45 M/UL (ref 3.8–5.8)
RBC # BLD: ABNORMAL 10*6/UL
SODIUM SERPL-SCNC: 132 MMOL/L (ref 132–146)
WBC OTHER # BLD: 17.1 K/UL (ref 4.5–11.5)

## 2023-11-01 PROCEDURE — 2580000003 HC RX 258: Performed by: INTERNAL MEDICINE

## 2023-11-01 PROCEDURE — 36415 COLL VENOUS BLD VENIPUNCTURE: CPT

## 2023-11-01 PROCEDURE — 6360000002 HC RX W HCPCS: Performed by: INTERNAL MEDICINE

## 2023-11-01 PROCEDURE — 85025 COMPLETE CBC W/AUTO DIFF WBC: CPT

## 2023-11-01 PROCEDURE — A9579 GAD-BASE MR CONTRAST NOS,1ML: HCPCS | Performed by: RADIOLOGY

## 2023-11-01 PROCEDURE — 2060000000 HC ICU INTERMEDIATE R&B

## 2023-11-01 PROCEDURE — 2580000003 HC RX 258: Performed by: NURSE PRACTITIONER

## 2023-11-01 PROCEDURE — 72156 MRI NECK SPINE W/O & W/DYE: CPT

## 2023-11-01 PROCEDURE — 84100 ASSAY OF PHOSPHORUS: CPT

## 2023-11-01 PROCEDURE — 80048 BASIC METABOLIC PNL TOTAL CA: CPT

## 2023-11-01 PROCEDURE — 6360000004 HC RX CONTRAST MEDICATION: Performed by: RADIOLOGY

## 2023-11-01 PROCEDURE — 6360000002 HC RX W HCPCS: Performed by: NURSE PRACTITIONER

## 2023-11-01 PROCEDURE — 6370000000 HC RX 637 (ALT 250 FOR IP): Performed by: PHYSICIAN ASSISTANT

## 2023-11-01 PROCEDURE — 2700000000 HC OXYGEN THERAPY PER DAY

## 2023-11-01 PROCEDURE — 6370000000 HC RX 637 (ALT 250 FOR IP): Performed by: NURSE PRACTITIONER

## 2023-11-01 PROCEDURE — 90935 HEMODIALYSIS ONE EVALUATION: CPT

## 2023-11-01 PROCEDURE — 83735 ASSAY OF MAGNESIUM: CPT

## 2023-11-01 RX ORDER — GABAPENTIN 600 MG/1
300 TABLET ORAL 3 TIMES DAILY
Status: DISCONTINUED | OUTPATIENT
Start: 2023-11-01 | End: 2023-11-01 | Stop reason: DRUGHIGH

## 2023-11-01 RX ORDER — GABAPENTIN 300 MG/1
300 CAPSULE ORAL
Status: DISCONTINUED | OUTPATIENT
Start: 2023-11-01 | End: 2023-11-02 | Stop reason: HOSPADM

## 2023-11-01 RX ADMIN — SODIUM CHLORIDE, PRESERVATIVE FREE 10 ML: 5 INJECTION INTRAVENOUS at 12:12

## 2023-11-01 RX ADMIN — AMLODIPINE BESYLATE 10 MG: 10 TABLET ORAL at 12:13

## 2023-11-01 RX ADMIN — GADOTERIDOL 18 ML: 279.3 INJECTION, SOLUTION INTRAVENOUS at 07:26

## 2023-11-01 RX ADMIN — ATORVASTATIN CALCIUM 40 MG: 40 TABLET, FILM COATED ORAL at 20:21

## 2023-11-01 RX ADMIN — HYDRALAZINE HYDROCHLORIDE 100 MG: 50 TABLET, FILM COATED ORAL at 12:12

## 2023-11-01 RX ADMIN — HEPARIN SODIUM 5000 UNITS: 10000 INJECTION INTRAVENOUS; SUBCUTANEOUS at 06:38

## 2023-11-01 RX ADMIN — ASPIRIN 325 MG: 325 TABLET ORAL at 12:12

## 2023-11-01 RX ADMIN — METOPROLOL TARTRATE 50 MG: 50 TABLET ORAL at 12:12

## 2023-11-01 RX ADMIN — HYDRALAZINE HYDROCHLORIDE 100 MG: 50 TABLET, FILM COATED ORAL at 20:21

## 2023-11-01 RX ADMIN — METOPROLOL TARTRATE 50 MG: 50 TABLET ORAL at 20:21

## 2023-11-01 RX ADMIN — HEPARIN SODIUM 5000 UNITS: 10000 INJECTION INTRAVENOUS; SUBCUTANEOUS at 14:03

## 2023-11-01 RX ADMIN — VALSARTAN 160 MG: 160 TABLET ORAL at 21:00

## 2023-11-01 RX ADMIN — CEFEPIME 1000 MG: 1 INJECTION, POWDER, FOR SOLUTION INTRAMUSCULAR; INTRAVENOUS at 12:17

## 2023-11-01 RX ADMIN — GABAPENTIN 300 MG: 300 CAPSULE ORAL at 17:56

## 2023-11-01 RX ADMIN — HEPARIN SODIUM 5000 UNITS: 10000 INJECTION INTRAVENOUS; SUBCUTANEOUS at 20:24

## 2023-11-01 ASSESSMENT — ENCOUNTER SYMPTOMS
EYES NEGATIVE: 1
ALLERGIC/IMMUNOLOGIC NEGATIVE: 1
GASTROINTESTINAL NEGATIVE: 1
SHORTNESS OF BREATH: 1
CHEST TIGHTNESS: 1

## 2023-11-02 VITALS
BODY MASS INDEX: 28.96 KG/M2 | HEART RATE: 85 BPM | SYSTOLIC BLOOD PRESSURE: 103 MMHG | OXYGEN SATURATION: 90 % | WEIGHT: 195.55 LBS | HEIGHT: 69 IN | RESPIRATION RATE: 18 BRPM | DIASTOLIC BLOOD PRESSURE: 62 MMHG | TEMPERATURE: 97.3 F

## 2023-11-02 LAB
ANION GAP SERPL CALCULATED.3IONS-SCNC: 12 MMOL/L (ref 7–16)
BASOPHILS # BLD: 0.09 K/UL (ref 0–0.2)
BASOPHILS NFR BLD: 1 % (ref 0–2)
BUN SERPL-MCNC: 48 MG/DL (ref 6–23)
CALCIUM SERPL-MCNC: 9 MG/DL (ref 8.6–10.2)
CHLORIDE SERPL-SCNC: 98 MMOL/L (ref 98–107)
CO2 SERPL-SCNC: 30 MMOL/L (ref 22–29)
CREAT SERPL-MCNC: 5.4 MG/DL (ref 0.7–1.2)
EOSINOPHIL # BLD: 0.72 K/UL (ref 0.05–0.5)
EOSINOPHILS RELATIVE PERCENT: 4 % (ref 0–6)
ERYTHROCYTE [DISTWIDTH] IN BLOOD BY AUTOMATED COUNT: 15.5 % (ref 11.5–15)
GFR SERPL CREATININE-BSD FRML MDRD: 11 ML/MIN/1.73M2
GLUCOSE SERPL-MCNC: 101 MG/DL (ref 74–99)
HCT VFR BLD AUTO: 26 % (ref 37–54)
HGB BLD-MCNC: 8.2 G/DL (ref 12.5–16.5)
IMM GRANULOCYTES # BLD AUTO: 0.5 K/UL (ref 0–0.58)
IMM GRANULOCYTES NFR BLD: 3 % (ref 0–5)
LYMPHOCYTES NFR BLD: 1.89 K/UL (ref 1.5–4)
LYMPHOCYTES RELATIVE PERCENT: 11 % (ref 20–42)
MAGNESIUM SERPL-MCNC: 1.9 MG/DL (ref 1.6–2.6)
MCH RBC QN AUTO: 31.5 PG (ref 26–35)
MCHC RBC AUTO-ENTMCNC: 31.5 G/DL (ref 32–34.5)
MCV RBC AUTO: 100 FL (ref 80–99.9)
MONOCYTES NFR BLD: 1.45 K/UL (ref 0.1–0.95)
MONOCYTES NFR BLD: 9 % (ref 2–12)
NEUTROPHILS NFR BLD: 73 % (ref 43–80)
NEUTS SEG NFR BLD: 12.35 K/UL (ref 1.8–7.3)
PHOSPHATE SERPL-MCNC: 7.4 MG/DL (ref 2.5–4.5)
PLATELET # BLD AUTO: 400 K/UL (ref 130–450)
PMV BLD AUTO: 9.8 FL (ref 7–12)
POTASSIUM SERPL-SCNC: 4.8 MMOL/L (ref 3.5–5)
RBC # BLD AUTO: 2.6 M/UL (ref 3.8–5.8)
SODIUM SERPL-SCNC: 140 MMOL/L (ref 132–146)
WBC OTHER # BLD: 17 K/UL (ref 4.5–11.5)

## 2023-11-02 PROCEDURE — 6370000000 HC RX 637 (ALT 250 FOR IP): Performed by: NURSE PRACTITIONER

## 2023-11-02 PROCEDURE — 5A1D70Z PERFORMANCE OF URINARY FILTRATION, INTERMITTENT, LESS THAN 6 HOURS PER DAY: ICD-10-PCS | Performed by: STUDENT IN AN ORGANIZED HEALTH CARE EDUCATION/TRAINING PROGRAM

## 2023-11-02 PROCEDURE — 36415 COLL VENOUS BLD VENIPUNCTURE: CPT

## 2023-11-02 PROCEDURE — 84100 ASSAY OF PHOSPHORUS: CPT

## 2023-11-02 PROCEDURE — 90935 HEMODIALYSIS ONE EVALUATION: CPT

## 2023-11-02 PROCEDURE — 2700000000 HC OXYGEN THERAPY PER DAY

## 2023-11-02 PROCEDURE — 6360000002 HC RX W HCPCS: Performed by: NURSE PRACTITIONER

## 2023-11-02 PROCEDURE — 83735 ASSAY OF MAGNESIUM: CPT

## 2023-11-02 PROCEDURE — 2580000003 HC RX 258: Performed by: NURSE PRACTITIONER

## 2023-11-02 PROCEDURE — 6360000002 HC RX W HCPCS: Performed by: INTERNAL MEDICINE

## 2023-11-02 PROCEDURE — 2580000003 HC RX 258: Performed by: INTERNAL MEDICINE

## 2023-11-02 PROCEDURE — 6370000000 HC RX 637 (ALT 250 FOR IP): Performed by: PHYSICIAN ASSISTANT

## 2023-11-02 PROCEDURE — 85025 COMPLETE CBC W/AUTO DIFF WBC: CPT

## 2023-11-02 PROCEDURE — 80048 BASIC METABOLIC PNL TOTAL CA: CPT

## 2023-11-02 RX ORDER — GABAPENTIN 300 MG/1
300 CAPSULE ORAL SEE ADMIN INSTRUCTIONS
Qty: 90 CAPSULE | Refills: 3 | Status: SHIPPED | OUTPATIENT
Start: 2023-11-02 | End: 2023-12-07

## 2023-11-02 RX ORDER — AMOXICILLIN AND CLAVULANATE POTASSIUM 500; 125 MG/1; MG/1
1 TABLET, FILM COATED ORAL
Status: DISCONTINUED | OUTPATIENT
Start: 2023-11-03 | End: 2023-11-02 | Stop reason: HOSPADM

## 2023-11-02 RX ORDER — AMOXICILLIN AND CLAVULANATE POTASSIUM 500; 125 MG/1; MG/1
1 TABLET, FILM COATED ORAL EVERY 24 HOURS
Qty: 3 TABLET | Refills: 0 | Status: SHIPPED | OUTPATIENT
Start: 2023-11-02 | End: 2023-11-05

## 2023-11-02 RX ADMIN — ACETAMINOPHEN 650 MG: 325 TABLET ORAL at 15:24

## 2023-11-02 RX ADMIN — GABAPENTIN 300 MG: 300 CAPSULE ORAL at 20:35

## 2023-11-02 RX ADMIN — HEPARIN SODIUM 5000 UNITS: 10000 INJECTION INTRAVENOUS; SUBCUTANEOUS at 15:25

## 2023-11-02 RX ADMIN — SODIUM CHLORIDE, PRESERVATIVE FREE 10 ML: 5 INJECTION INTRAVENOUS at 11:22

## 2023-11-02 RX ADMIN — HYDRALAZINE HYDROCHLORIDE 100 MG: 50 TABLET, FILM COATED ORAL at 11:22

## 2023-11-02 RX ADMIN — CEFEPIME 1000 MG: 1 INJECTION, POWDER, FOR SOLUTION INTRAMUSCULAR; INTRAVENOUS at 11:30

## 2023-11-02 RX ADMIN — HEPARIN SODIUM 5000 UNITS: 10000 INJECTION INTRAVENOUS; SUBCUTANEOUS at 06:42

## 2023-11-02 RX ADMIN — METOPROLOL TARTRATE 50 MG: 50 TABLET ORAL at 11:21

## 2023-11-02 RX ADMIN — AMLODIPINE BESYLATE 10 MG: 10 TABLET ORAL at 11:22

## 2023-11-02 RX ADMIN — ASPIRIN 325 MG: 325 TABLET ORAL at 11:21

## 2023-11-02 ASSESSMENT — PAIN DESCRIPTION - FREQUENCY: FREQUENCY: CONTINUOUS

## 2023-11-02 ASSESSMENT — PAIN DESCRIPTION - DESCRIPTORS: DESCRIPTORS: ACHING;DISCOMFORT;DULL

## 2023-11-02 ASSESSMENT — PAIN SCALES - GENERAL
PAINLEVEL_OUTOF10: 0
PAINLEVEL_OUTOF10: 2
PAINLEVEL_OUTOF10: 0

## 2023-11-02 ASSESSMENT — PAIN DESCRIPTION - ONSET: ONSET: ON-GOING

## 2023-11-02 ASSESSMENT — PAIN DESCRIPTION - PAIN TYPE: TYPE: ACUTE PAIN

## 2023-11-02 ASSESSMENT — PAIN DESCRIPTION - LOCATION: LOCATION: HEAD

## 2023-11-02 ASSESSMENT — PAIN - FUNCTIONAL ASSESSMENT: PAIN_FUNCTIONAL_ASSESSMENT: ACTIVITIES ARE NOT PREVENTED

## 2023-11-02 NOTE — FLOWSHEET NOTE
11/02/23 0948   Vital Signs   BP (!) 116/58   Pulse 89   Respirations 20   Weight - Scale 88.7 kg (195 lb 8.8 oz)   Weight Method Bed scale   Percent Weight Change -0.11   Pain Assessment   Pain Assessment None - Denies Pain   Post-Hemodialysis Assessment   Post-Treatment Procedures Blood returned; Access bleeding time < 10 minutes   Machine Disinfection Process Exterior Machine Disinfection   Rinseback Volume (ml) 300 ml   Blood Volume Processed (Liters) 38.2 L   Dialyzer Clearance Lightly streaked   Duration of Treatment (minutes) 120 minutes   Heparin Amount Administered During Treatment (mL) 0 mL   Hemodialysis Intake (ml) 600 ml   Hemodialysis Output (ml) 566 ml   NET Removed (ml) -34   Interventions Taken Ultrafiltration stopped   Patient Response to Treatment No fluid removed d/t hypotension.  treatment shortened to 2 hours per Dr. Leon Israel   Bilateral Breath Sounds Diminished   Edema Left lower extremity   LLE Edema +2   Physician Notified Yes   Time Off 8913   Patient Disposition Return to room

## 2023-11-02 NOTE — CARE COORDINATION
CM Update: Met with patient and wife Tammy Mcgee at bedside. Patient is current with [facility] MWF 5:30am chair time. Currently on oxygen 3L, he does not have home oxygen. Asmita Centeno will follow for O2 if needed at discharge. If unable to wean O2, then will need walking pulse ox testing prior to discharge. Plan is home with no needs when released. Consults: ID, Nephrology, Neurosurgery. IV cefepime Q24. CM/SW to follow. MT    The Plan for Transition of Care is related to the following treatment goals: Oxygen    The Patient and/or patient representative Tammy Mcgee was provided with a choice of provider and agrees   with the discharge plan. [x] Yes [] No    Freedom of choice list was provided with basic dialogue that supports the patient's individualized plan of care/goals, treatment preferences and shares the quality data associated with the providers.  [x] Yes [] No
CM Update: Met with patient at bedside. Patient is current with [facility] MWF 5:30am chair time. Currently on oxygen 2 Liters nasal canula. Room air baseline. Aisha Charles will follow for O2 if needed at discharge. If unable to wean O2, then will need walking pulse ox testing prior to discharge. Plan is home with no needs when released. fiordaliza to hospital with CP and SOB. Found to have pneumonia. Consults: ID, Nephrology, Neurosurgery. IV cefepime Q24. CM/SW to follow.  MT
Independent in ADLs/IADLs  Current functional level: Independent in ADLs/IADLs    PT AM-PAC:   /24  OT AM-PAC:   /24    Family can provide assistance at DC: Yes  Would you like Case Management to discuss the discharge plan with any other family members/significant others, and if so, who? No  Plans to Return to Present Housing: Yes  Other Identified Issues/Barriers to RETURNING to current housing:   Potential Assistance needed at discharge: N/A            Potential DME:    Patient expects to discharge to: [address] [place] [address] for transportation at discharge:      Financial    Payor: Leonel Lowe / Plan: MEDICARE PART A AND B / Product Type: *No Product type* /     Does insurance require precert for SNF: No    Potential assistance Purchasing Medications: No  Meds-to-Beds request: Yes      [address] [facility],[address] [place], [address]  [address] [facility]. [facility] [address]  Phone: [phone] Fax: [phone]      Notes:    Factors facilitating achievement of predicted outcomes: Family support, Motivated, Cooperative, and Pleasant    Barriers to discharge: electrolyte imbalance    Additional Case Management Notes: The Plan for Transition of Care is related to the following treatment goals of Hyperkalemia [E87.5]  Acute respiratory failure with hypoxia ([address]) [J96.01]  Pneumonia of both lungs due to infectious organism, unspecified part of lung [J18.9]  Pneumonia, unspecified organism [N01.4]    IF APPLICABLE: The Patient and/or patient representative Silvana Ledbetter and his family were provided with a choice of provider and agrees with the discharge plan. Freedom of choice list with basic dialogue that supports the patient's individualized plan of care/goals and shares the quality data associated with the providers was provided to:     Patient Representative Name:       The Patient and/or Patient Representative Agree with the Discharge Plan?       Lysbeth Holter, [address] [place] [address]  Case Management

## 2023-11-03 ENCOUNTER — TELEPHONE (OUTPATIENT)
Dept: NEUROSURGERY | Age: 68
End: 2023-11-03

## 2023-11-03 ENCOUNTER — CARE COORDINATION (OUTPATIENT)
Dept: OTHER | Facility: CLINIC | Age: 68
End: 2023-11-03

## 2023-11-03 NOTE — CARE COORDINATION
Care Transitions Initial Follow Up Call    Call within 2 business days of discharge: Yes    Patient Current Location:  Home: [place] [address]    Care Transition Nurse contacted the patient by telephone to perform post hospital discharge assessment. Verified name and  with patient as identifiers. Provided introduction to self, and explanation of the Care Transition Nurse role. Patient: Vargas Alonzo Patient : 1955   MRN: [id number]  Reason for Admission: acute respiratory failure, pneumonia   Discharge Date: 23 RARS: Readmission Risk Score: 18.9      Last Discharge Facility       Date Complaint Diagnosis Description Type Department Provider    10/30/23 Chest Pain Acute respiratory failure with hypoxia ([address]) . .. ED to Hosp-Admission (Discharged) (ADMITTED) [facility] Donna Luna MD; Rosa Siddiqi . .. Was this an external facility discharge? No Discharge Facility: [facility]     Challenges to be reviewed by the provider   Additional needs identified to be addressed with provider: No  none               Method of communication with provider: none. Pt states he is doing well. No coughing during our call, he denies fever. He is taking the Augmentin as ordered. Pt reminded to make follow up appt. He denies having any difficulty breathing. Care Transition Nurse reviewed discharge instructions with patient who verbalized understanding. The patient was given an opportunity to ask questions and does not have any further questions or concerns at this time. Were discharge instructions available to patient? Yes. Reviewed appropriate site of care based on symptoms and resources available to patient including: PCP. The patient agrees to contact the PCP office for questions related to their healthcare. Advance Care Planning:   Does patient have an Advance Directive: not on file.     Medication reconciliation was performed with patient, who verbalizes understanding of

## 2023-11-04 LAB
MICROORGANISM SPEC CULT: NORMAL
MICROORGANISM SPEC CULT: NORMAL
SERVICE CMNT-IMP: NORMAL
SERVICE CMNT-IMP: NORMAL
SPECIMEN DESCRIPTION: NORMAL
SPECIMEN DESCRIPTION: NORMAL

## 2023-11-08 PROBLEM — J96.01 ACUTE RESPIRATORY FAILURE WITH HYPOXIA (HCC): Status: ACTIVE | Noted: 2023-11-08

## 2023-11-09 ENCOUNTER — HOSPITAL ENCOUNTER (OUTPATIENT)
Dept: GENERAL RADIOLOGY | Age: 68
Discharge: HOME | End: 2023-11-11
Payer: MEDICARE

## 2023-11-09 ENCOUNTER — HOSPITAL ENCOUNTER (OUTPATIENT)
Age: 68
Discharge: HOME | End: 2023-11-09
Payer: MEDICARE

## 2023-11-09 ENCOUNTER — CARE COORDINATION (OUTPATIENT)
Dept: OTHER | Facility: CLINIC | Age: 68
End: 2023-11-09

## 2023-11-09 ENCOUNTER — HOSPITAL ENCOUNTER (OUTPATIENT)
Age: 68
Discharge: HOME | End: 2023-11-11
Payer: MEDICARE

## 2023-11-09 DIAGNOSIS — I50.32 CHRONIC DIASTOLIC HEART FAILURE (HCC): ICD-10-CM

## 2023-11-09 LAB
ANION GAP SERPL CALCULATED.3IONS-SCNC: 17 MMOL/L (ref 7–16)
BASOPHILS # BLD: 0.07 K/UL (ref 0–0.2)
BASOPHILS NFR BLD: 1 % (ref 0–2)
BNP SERPL-MCNC: 7944 PG/ML (ref 0–125)
BUN SERPL-MCNC: 62 MG/DL (ref 6–23)
CALCIUM SERPL-MCNC: 9.4 MG/DL (ref 8.6–10.2)
CHLORIDE SERPL-SCNC: 99 MMOL/L (ref 98–107)
CO2 SERPL-SCNC: 26 MMOL/L (ref 22–29)
CREAT SERPL-MCNC: 6.7 MG/DL (ref 0.7–1.2)
EOSINOPHIL # BLD: 0.3 K/UL (ref 0.05–0.5)
EOSINOPHILS RELATIVE PERCENT: 2 % (ref 0–6)
ERYTHROCYTE [DISTWIDTH] IN BLOOD BY AUTOMATED COUNT: 16 % (ref 11.5–15)
GFR SERPL CREATININE-BSD FRML MDRD: 8 ML/MIN/1.73M2
GLUCOSE SERPL-MCNC: 92 MG/DL (ref 74–99)
HCT VFR BLD AUTO: 23.9 % (ref 37–54)
HGB BLD-MCNC: 7.3 G/DL (ref 12.5–16.5)
IMM GRANULOCYTES # BLD AUTO: 0.14 K/UL (ref 0–0.58)
IMM GRANULOCYTES NFR BLD: 1 % (ref 0–5)
LYMPHOCYTES NFR BLD: 1.37 K/UL (ref 1.5–4)
LYMPHOCYTES RELATIVE PERCENT: 11 % (ref 20–42)
MCH RBC QN AUTO: 31.5 PG (ref 26–35)
MCHC RBC AUTO-ENTMCNC: 30.5 G/DL (ref 32–34.5)
MCV RBC AUTO: 103 FL (ref 80–99.9)
MONOCYTES NFR BLD: 1.38 K/UL (ref 0.1–0.95)
MONOCYTES NFR BLD: 11 % (ref 2–12)
NEUTROPHILS NFR BLD: 75 % (ref 43–80)
NEUTS SEG NFR BLD: 9.63 K/UL (ref 1.8–7.3)
PLATELET # BLD AUTO: 272 K/UL (ref 130–450)
PMV BLD AUTO: 9.7 FL (ref 7–12)
POTASSIUM SERPL-SCNC: 5.8 MMOL/L (ref 3.5–5)
RBC # BLD AUTO: 2.32 M/UL (ref 3.8–5.8)
SODIUM SERPL-SCNC: 142 MMOL/L (ref 132–146)
WBC OTHER # BLD: 12.9 K/UL (ref 4.5–11.5)

## 2023-11-09 PROCEDURE — 71046 X-RAY EXAM CHEST 2 VIEWS: CPT

## 2023-11-09 PROCEDURE — 80048 BASIC METABOLIC PNL TOTAL CA: CPT

## 2023-11-09 PROCEDURE — 83880 ASSAY OF NATRIURETIC PEPTIDE: CPT

## 2023-11-09 PROCEDURE — 85025 COMPLETE CBC W/AUTO DIFF WBC: CPT

## 2023-11-09 PROCEDURE — 36415 COLL VENOUS BLD VENIPUNCTURE: CPT

## 2023-11-09 NOTE — CARE COORDINATION
Spoke with patient very briefly today, Pt answers with one word answers . Pt said he is fine. ACM will sign off at this time    Dakota SHARPEN, [address] [facility]  [phone]  [email or web address]

## 2023-11-12 PROBLEM — M54.2 NECK PAIN: Status: ACTIVE | Noted: 2023-11-12

## 2023-11-27 ENCOUNTER — TELEPHONE (OUTPATIENT)
Dept: ORTHOPEDIC SURGERY | Age: 68
End: 2023-11-27

## 2023-11-27 NOTE — TELEPHONE ENCOUNTER
Referral placed by [employer]. Patient was seen in the ER on 10/09/2023 for left shoulder pain. Referral reason: left shoulder pain    IMPRESSION:  No acute osseous abnormality. Tiny calcific density at the superior aspect of the left humeral head, which  could suggest calcific tendinopathy. Clinical correlation recommended. Routed to providers for recommendations. No future appointments.

## 2023-12-01 NOTE — TELEPHONE ENCOUNTER
Left message for patient to call the office, ED f/u for L shoulder pain. No work-up/conservative management. Can schedule with Dr. Mireya Kern.

## 2023-12-11 ENCOUNTER — HOSPITAL ENCOUNTER (OUTPATIENT)
Age: 68
Discharge: HOME | End: 2023-12-11
Payer: MEDICARE

## 2023-12-11 ENCOUNTER — HOSPITAL ENCOUNTER (OUTPATIENT)
Dept: GENERAL RADIOLOGY | Age: 68
Discharge: HOME | End: 2023-12-13
Payer: MEDICARE

## 2023-12-11 ENCOUNTER — HOSPITAL ENCOUNTER (OUTPATIENT)
Age: 68
Discharge: HOME | End: 2023-12-13
Payer: MEDICARE

## 2023-12-11 DIAGNOSIS — J18.1 UNRESOLVED LOBAR PNEUMONIA (HCC): ICD-10-CM

## 2023-12-11 LAB
ANION GAP SERPL CALCULATED.3IONS-SCNC: 17 MMOL/L (ref 7–16)
BASOPHILS # BLD: 0.07 K/UL (ref 0–0.2)
BASOPHILS NFR BLD: 1 % (ref 0–2)
BNP SERPL-MCNC: 7579 PG/ML (ref 0–125)
BUN SERPL-MCNC: 25 MG/DL (ref 6–23)
CALCIUM SERPL-MCNC: 9.8 MG/DL (ref 8.6–10.2)
CHLORIDE SERPL-SCNC: 98 MMOL/L (ref 98–107)
CO2 SERPL-SCNC: 25 MMOL/L (ref 22–29)
CREAT SERPL-MCNC: 4.1 MG/DL (ref 0.7–1.2)
EOSINOPHIL # BLD: 0.26 K/UL (ref 0.05–0.5)
EOSINOPHILS RELATIVE PERCENT: 4 % (ref 0–6)
ERYTHROCYTE [DISTWIDTH] IN BLOOD BY AUTOMATED COUNT: 14.5 % (ref 11.5–15)
GFR SERPL CREATININE-BSD FRML MDRD: 15 ML/MIN/1.73M2
GLUCOSE SERPL-MCNC: 86 MG/DL (ref 74–99)
HCT VFR BLD AUTO: 32.7 % (ref 37–54)
HGB BLD-MCNC: 10.7 G/DL (ref 12.5–16.5)
IMM GRANULOCYTES # BLD AUTO: 0.03 K/UL (ref 0–0.58)
IMM GRANULOCYTES NFR BLD: 1 % (ref 0–5)
LYMPHOCYTES NFR BLD: 0.84 K/UL (ref 1.5–4)
LYMPHOCYTES RELATIVE PERCENT: 13 % (ref 20–42)
MCH RBC QN AUTO: 31.1 PG (ref 26–35)
MCHC RBC AUTO-ENTMCNC: 32.7 G/DL (ref 32–34.5)
MCV RBC AUTO: 95.1 FL (ref 80–99.9)
MONOCYTES NFR BLD: 0.84 K/UL (ref 0.1–0.95)
MONOCYTES NFR BLD: 13 % (ref 2–12)
NEUTROPHILS NFR BLD: 69 % (ref 43–80)
NEUTS SEG NFR BLD: 4.59 K/UL (ref 1.8–7.3)
PLATELET # BLD AUTO: 251 K/UL (ref 130–450)
PMV BLD AUTO: 9.7 FL (ref 7–12)
POTASSIUM SERPL-SCNC: 4.8 MMOL/L (ref 3.5–5)
RBC # BLD AUTO: 3.44 M/UL (ref 3.8–5.8)
SODIUM SERPL-SCNC: 140 MMOL/L (ref 132–146)
WBC OTHER # BLD: 6.6 K/UL (ref 4.5–11.5)

## 2023-12-11 PROCEDURE — 83880 ASSAY OF NATRIURETIC PEPTIDE: CPT

## 2023-12-11 PROCEDURE — 80048 BASIC METABOLIC PNL TOTAL CA: CPT

## 2023-12-11 PROCEDURE — 71046 X-RAY EXAM CHEST 2 VIEWS: CPT

## 2023-12-11 PROCEDURE — 85025 COMPLETE CBC W/AUTO DIFF WBC: CPT

## 2023-12-11 PROCEDURE — 36415 COLL VENOUS BLD VENIPUNCTURE: CPT

## 2024-01-09 ENCOUNTER — HOSPITAL ENCOUNTER (OUTPATIENT)
Age: 69
End: 2024-01-09
Payer: MEDICARE

## 2024-01-09 ENCOUNTER — HOSPITAL ENCOUNTER (OUTPATIENT)
Dept: GENERAL RADIOLOGY | Age: 69
Discharge: HOME | End: 2024-01-11
Payer: MEDICARE

## 2024-01-09 DIAGNOSIS — J18.1 UNRESOLVED LOBAR PNEUMONIA (HCC): ICD-10-CM

## 2024-01-09 PROCEDURE — 71046 X-RAY EXAM CHEST 2 VIEWS: CPT

## 2024-05-20 ENCOUNTER — TELEPHONE (OUTPATIENT)
Dept: NEUROSURGERY | Age: 69
End: 2024-05-20

## 2024-05-20 DIAGNOSIS — M54.12 CERVICAL RADICULOPATHY: ICD-10-CM

## 2024-05-20 DIAGNOSIS — M54.2 NECK PAIN: Primary | ICD-10-CM

## 2024-05-20 NOTE — TELEPHONE ENCOUNTER
Referral placed, looks like [facility] close, if patient is okay with this we can fax it there, if not patient can let us know where to fax

## 2024-05-20 NOTE — TELEPHONE ENCOUNTER
Spoke to patients wife  patient wanted a follow up visit but I read doctors last note  he never did PT  can you put order in for somewhere close to [facility]

## 2024-09-06 ENCOUNTER — CARE COORDINATION (OUTPATIENT)
Dept: OTHER | Facility: CLINIC | Age: 69
End: 2024-09-06

## 2024-09-06 NOTE — CARE COORDINATION
Care Transitions Note    Initial Call - Call within 2 business days of discharge: Yes    Patient Current Location:  Home: [address] [place] [id number]    Care Transition Nurse contacted the spouse/partner  by telephone to perform post hospital discharge assessment, verified name and  as identifiers. Provided introduction to self, and explanation of the Care Transition Nurse role.     Patient: Harjeet Garcia    Patient : 1955   MRN: [id number]    Reason for Admission: Pneumonia  Discharge Date: 9/3/2024 RURS: Readmission Risk Score: 18.9      Last Discharge Facility      Was this an external facility discharge? Yes. Discharge Date: 9/3/24. Facility Name: [facility]    Additional needs identified to be addressed with provider   No needs identified         Method of communication with provider: none.    Patients top risk factors for readmission: medical condition-Pneumonia with ongoing treatment and multiple Chronic conditions needing management and multiple health system providers    Interventions to address risk factors:   Review of patient management of conditions/medications: Patients wife states his medications are filled and he is taking as directed. Pt's wife will call and schedule follow up appts with providers.  DME: Patient has O2 and CPAP ordered and will be delivered today through [facility] Summary Note: Patient's wife states he is doing much better. She spoke with [facility] this morning and he is to have his O2 and CPAP delivered today.  She states he has all of his medications filled and he is taking them as directed.  She deferred full medication reconciliation at this time. She states she did not make his follow up appts yet as she was waiting for the Oxygen to be set up first. She will call to make the appts today. She was not able to speak at length as she was getting another call, but she is agreeable to follow up contact.     Care Transition Nurse reviewed discharge instructions and medical

## 2024-09-10 ENCOUNTER — CARE COORDINATION (OUTPATIENT)
Dept: OTHER | Facility: CLINIC | Age: 69
End: 2024-09-10